# Patient Record
Sex: MALE | Race: BLACK OR AFRICAN AMERICAN | NOT HISPANIC OR LATINO | Employment: FULL TIME | RURAL
[De-identification: names, ages, dates, MRNs, and addresses within clinical notes are randomized per-mention and may not be internally consistent; named-entity substitution may affect disease eponyms.]

---

## 2022-11-28 ENCOUNTER — OFFICE VISIT (OUTPATIENT)
Dept: FAMILY MEDICINE CLINIC | Facility: CLINIC | Age: 52
End: 2022-11-28

## 2022-11-28 ENCOUNTER — TELEPHONE (OUTPATIENT)
Dept: FAMILY MEDICINE CLINIC | Facility: CLINIC | Age: 52
End: 2022-11-28

## 2022-11-28 VITALS
DIASTOLIC BLOOD PRESSURE: 84 MMHG | BODY MASS INDEX: 39.17 KG/M2 | HEIGHT: 75 IN | HEART RATE: 81 BPM | OXYGEN SATURATION: 97 % | WEIGHT: 315 LBS | SYSTOLIC BLOOD PRESSURE: 124 MMHG

## 2022-11-28 DIAGNOSIS — Z12.5 SCREENING FOR PROSTATE CANCER: ICD-10-CM

## 2022-11-28 DIAGNOSIS — Z13.1 SCREENING FOR DIABETES MELLITUS: ICD-10-CM

## 2022-11-28 DIAGNOSIS — E78.2 MIXED HYPERLIPIDEMIA: ICD-10-CM

## 2022-11-28 DIAGNOSIS — J45.20 MILD INTERMITTENT ASTHMA WITHOUT COMPLICATION: ICD-10-CM

## 2022-11-28 DIAGNOSIS — G89.29 CHRONIC PAIN OF LEFT KNEE: ICD-10-CM

## 2022-11-28 DIAGNOSIS — I10 BENIGN ESSENTIAL HTN: Primary | ICD-10-CM

## 2022-11-28 DIAGNOSIS — M25.562 CHRONIC PAIN OF LEFT KNEE: ICD-10-CM

## 2022-11-28 DIAGNOSIS — G47.33 OSA (OBSTRUCTIVE SLEEP APNEA): Primary | ICD-10-CM

## 2022-11-28 PROCEDURE — 99204 OFFICE O/P NEW MOD 45 MIN: CPT | Performed by: NURSE PRACTITIONER

## 2022-11-28 RX ORDER — AMLODIPINE BESYLATE 10 MG/1
10 TABLET ORAL DAILY
COMMUNITY
Start: 2022-09-15 | End: 2022-12-27 | Stop reason: SDUPTHER

## 2022-11-28 RX ORDER — DIPHENOXYLATE HYDROCHLORIDE AND ATROPINE SULFATE 2.5; .025 MG/1; MG/1
TABLET ORAL DAILY
COMMUNITY

## 2022-11-28 RX ORDER — VALSARTAN 80 MG/1
80 TABLET ORAL DAILY
COMMUNITY
Start: 2022-10-07 | End: 2022-12-27 | Stop reason: SDUPTHER

## 2022-11-28 RX ORDER — CETIRIZINE HYDROCHLORIDE 10 MG/1
10 TABLET ORAL DAILY
COMMUNITY

## 2022-11-28 RX ORDER — ALBUTEROL SULFATE 90 UG/1
2 AEROSOL, METERED RESPIRATORY (INHALATION) EVERY 4 HOURS PRN
Qty: 18 G | Refills: 1 | Status: SHIPPED | OUTPATIENT
Start: 2022-11-28 | End: 2023-03-08 | Stop reason: SDUPTHER

## 2022-11-28 NOTE — ASSESSMENT & PLAN NOTE
Continue medications.  Denies refills.  Patient states he will return this week for fasting labs.  Goal blood pressure less than 140/90.  Let me know if gets to or above that.  Proper diet and exercise plan discussed and encouraged.  Education provided.  Risk of meds discussed and understood.  Return to clinic or ED with any issues or concerns.

## 2022-11-28 NOTE — PROGRESS NOTES
"Chief Complaint  Med Refill    Subjective          Zane Rebollar presents to [facility]  History of Present Illness     Patient presents to establish care.  He states he was seeing someone in [place] but always had trouble getting in so was coming here.  History of asthma and uses albuterol inhaler only as needed.  Hypertension well-controlled on amlodipine and valsartan.  Allergies well controlled on Zyrtec.  Also takes a daily multivitamin.  States he is about due his yearly blood work.  States no cardiac issues.  No smoking no alcohol use.  Tries to watch his diet but could do better.  No dizziness no headache no chest pain no chest pressure no shortness of breath no trouble breathing no urinary or bowel issues.    States in January 2022 he was at work in 6 railroad ties fell onto his left leg resulting in tib-fib fractures.  States he underwent 2 surgeries and has a kelley in his left leg.  States they went back in and scraped his left knee due to popping and states that did improve the knee.  [facility] surgeries were completed by  in [place].  He was doing physical therapy but states that was stopped due to Worker's Comp.  States he continues to have weakness in the left knee and feels like it is going to buckle and give out at times.  States he tried going to the gym but has no strength in his knee and cannot extend it.  No redness no warmth.  He states he is in limbo and is not sure where he needs to go with his knee at this point.    States he had a colonoscopy last month at [facility] so we will try to get those records.    History of sleep apnea and he does wear his CPAP.    States he is up-to-date on COVID vaccines.  He will discuss shingles vaccine with his pharmacist.  Denies flu shots.    Objective   Vital Signs:   /84   Pulse 81   Ht 190.5 cm (75\")   Wt (!) 173 kg (382 lb)   SpO2 97%   BMI 47.75 kg/m²     Body mass index is 47.75 " kg/m².    Review of Systems   Constitutional: Negative for chills, fatigue and fever.   Eyes: Negative for visual disturbance.   Respiratory: Negative for cough, shortness of breath and wheezing.    Cardiovascular: Negative.    Gastrointestinal: Negative for abdominal pain, constipation, diarrhea, nausea and vomiting.   Endocrine: Negative for polydipsia, polyphagia and polyuria.   Genitourinary: Negative for decreased urine volume, dysuria, frequency, hematuria and urgency.   Musculoskeletal: Positive for arthralgias. Negative for back pain, joint swelling and myalgias.   Skin: Negative for rash and wound.   Neurological: Negative for dizziness, weakness, numbness and headache.   Psychiatric/Behavioral: Negative.        Past History:  Medical History: has no past medical history on file.   Surgical History: has no past surgical history on file.   Family History: family history is not on file.   Social History: reports that he has never smoked. He has never used smokeless tobacco.    PHQ-2 Depression Screening  Little interest or pleasure in doing things? 0-->not at all   Feeling down, depressed, or hopeless? 0-->not at all   PHQ-2 Total Score 0        PHQ-9 Depression Screening  Little interest or pleasure in doing things? 0-->not at all   Feeling down, depressed, or hopeless? 0-->not at all   Trouble falling or staying asleep, or sleeping too much?     Feeling tired or having little energy?     Poor appetite or overeating?     Feeling bad about yourself - or that you are a failure or have let yourself or your family down?     Trouble concentrating on things, such as reading the newspaper or watching television?     Moving or speaking so slowly that other people could have noticed? Or the opposite - being so fidgety or restless that you have been moving around a lot more than usual?     Thoughts that you would be better off dead, or of hurting yourself in some way?     PHQ-9 Total Score 0   If you checked off any  problems, how difficult have these problems made it for you to do your work, take care of things at home, or get along with other people?       PHQ-9 Total Score: 0      Patient screened positive for depression based on a PHQ-9 score of 0 on 11/28/2022. Follow-up recommendations include:          Current Outpatient Medications:   •  amLODIPine (NORVASC) 10 MG tablet, Take 10 mg by mouth Daily., Disp: , Rfl:   •  cetirizine (zyrTEC) 10 MG tablet, Take 10 mg by mouth Daily., Disp: , Rfl:   •  multivitamin (ONE-A-DAY MENS PO), Take  by mouth Daily., Disp: , Rfl:   •  valsartan (DIOVAN) 80 MG tablet, Take 80 mg by mouth Daily., Disp: , Rfl:   •  albuterol sulfate  (90 Base) MCG/ACT inhaler, Inhale 2 puffs Every 4 (Four) Hours As Needed for Wheezing., Disp: 18 g, Rfl: 1   (Not in a hospital admission)     Allergies: Patient has no known allergies.    Physical Exam  Constitutional:       Appearance: Normal appearance. He is obese.   HENT:      Right Ear: Tympanic membrane, ear canal and external ear normal.      Left Ear: Tympanic membrane, ear canal and external ear normal.      Nose: Nose normal.      Mouth/Throat:      Mouth: Mucous membranes are moist.      Pharynx: Oropharynx is clear.   Eyes:      Extraocular Movements: Extraocular movements intact.      Conjunctiva/sclera: Conjunctivae normal.      Pupils: Pupils are equal, round, and reactive to light.   Cardiovascular:      Rate and Rhythm: Normal rate and regular rhythm.      Heart sounds: Normal heart sounds.   Pulmonary:      Effort: Pulmonary effort is normal.      Breath sounds: Normal breath sounds.   Musculoskeletal:      Left knee: No swelling, erythema or crepitus. Normal range of motion. No tenderness. Normal alignment. Normal pulse.      Right lower leg: No edema.      Left lower leg: No edema.      Comments: Slight tenderness to left medial knee   Skin:     General: Skin is warm.      Findings: No erythema or rash.   Neurological:      General:  No focal deficit present.      Mental Status: He is alert and oriented to person, place, and time. Mental status is at baseline.   Psychiatric:         Mood and Affect: Mood normal.         Behavior: Behavior normal.         Thought Content: Thought content normal.         Judgment: Judgment normal.          Result Review :                   Assessment and Plan    Diagnoses and all orders for this visit:    1. Benign essential HTN (Primary)  Assessment & Plan:  Continue medications.  Denies refills.  Patient states he will return this week for fasting labs.  Goal blood pressure less than 140/90.  Let me know if gets to or above that.  Proper diet and exercise plan discussed and encouraged.  Education provided.  Risk of meds discussed and understood.  Return to clinic or ED with any issues or concerns.    Orders:  -     CBC & Differential; Future  -     Comprehensive Metabolic Panel; Future  -     TSH Rfx On Abnormal To Free T4; Future  -     Lipid Panel; Future  -     POC Urinalysis Dipstick, Automated; Future    2. Mixed hyperlipidemia  Assessment & Plan:  Patient will return for fasting labs.      3. Screening for diabetes mellitus  -     Hemoglobin A1c; Future    4. Chronic pain of left knee  Assessment & Plan:  Gave order for physical therapy.  RICE encouraged.  Will try to get him a follow-up appointment with his orthopedist  for further evaluation.  Go to ED if worsens.  Return to clinic or ED with any issues or concerns.    Orders:  -     Miscellaneous DME  -     Ambulatory Referral to Orthopedic Surgery    5. Mild intermittent asthma without complication  Assessment & Plan:  Albuterol inhaler refilled to use as needed.  Risk discussed understood.  Return to clinic or ED with any issues or concerns.    Orders:  -     albuterol sulfate  (90 Base) MCG/ACT inhaler; Inhale 2 puffs Every 4 (Four) Hours As Needed for Wheezing.  Dispense: 18 g; Refill: 1    6. Screening for prostate cancer  -     PSA  SCREENING; Future            Class 3 Severe Obesity (BMI >=40). Obesity-related health conditions include the following: obstructive sleep apnea and hypertension. Obesity is improving with treatment. BMI is is above average; BMI management plan is completed. We discussed portion control and increasing exercise.       Follow Up   Return in about 3 months (around 2/28/2023).  Patient was given instructions and counseling regarding his condition or for health maintenance advice. Please see specific information pulled into the AVS if appropriate.     ZACK Felix

## 2022-11-28 NOTE — TELEPHONE ENCOUNTER
Please get colonoscopy report from [facility] and get put into care gaps. Thanks

## 2022-11-28 NOTE — ASSESSMENT & PLAN NOTE
Gave order for physical therapy.  RICE encouraged.  Will try to get him a follow-up appointment with his orthopedist  for further evaluation.  Go to ED if worsens.  Return to clinic or ED with any issues or concerns.

## 2022-11-28 NOTE — TELEPHONE ENCOUNTER
Patient needs a new script sent to [facility] in [place] for new filters and supplies for his CPAP.

## 2022-11-28 NOTE — ASSESSMENT & PLAN NOTE
Albuterol inhaler refilled to use as needed.  Risk discussed understood.  Return to clinic or ED with any issues or concerns.

## 2022-11-29 NOTE — TELEPHONE ENCOUNTER
Sending to [facility] regarding cpap supplies.    Will get cscope from [facility].

## 2022-11-29 NOTE — TELEPHONE ENCOUNTER
Order for cpap machine supplies printed and given to waylon. Please fax order to requesting supply store listed below. Tanks

## 2022-11-30 ENCOUNTER — LAB (OUTPATIENT)
Dept: FAMILY MEDICINE CLINIC | Facility: CLINIC | Age: 52
End: 2022-11-30

## 2022-11-30 DIAGNOSIS — Z13.1 SCREENING FOR DIABETES MELLITUS: ICD-10-CM

## 2022-11-30 DIAGNOSIS — Z12.5 SCREENING FOR PROSTATE CANCER: ICD-10-CM

## 2022-11-30 DIAGNOSIS — I10 BENIGN ESSENTIAL HTN: ICD-10-CM

## 2022-11-30 PROCEDURE — 36415 COLL VENOUS BLD VENIPUNCTURE: CPT | Performed by: NURSE PRACTITIONER

## 2022-12-01 LAB
ALBUMIN SERPL-MCNC: 4.3 G/DL (ref 3.8–4.9)
ALBUMIN/GLOB SERPL: 1.3 {RATIO} (ref 1.2–2.2)
ALP SERPL-CCNC: 125 IU/L (ref 44–121)
ALT SERPL-CCNC: 19 IU/L (ref 0–44)
AST SERPL-CCNC: 20 IU/L (ref 0–40)
BASOPHILS # BLD AUTO: 0 X10E3/UL (ref 0–0.2)
BASOPHILS NFR BLD AUTO: 0 %
BILIRUB SERPL-MCNC: 0.3 MG/DL (ref 0–1.2)
BUN SERPL-MCNC: 13 MG/DL (ref 6–24)
BUN/CREAT SERPL: 13 (ref 9–20)
CALCIUM SERPL-MCNC: 9.7 MG/DL (ref 8.7–10.2)
CHLORIDE SERPL-SCNC: 98 MMOL/L (ref 96–106)
CHOLEST SERPL-MCNC: 222 MG/DL (ref 100–199)
CO2 SERPL-SCNC: 26 MMOL/L (ref 20–29)
CREAT SERPL-MCNC: 1.02 MG/DL (ref 0.76–1.27)
EGFRCR SERPLBLD CKD-EPI 2021: 88 ML/MIN/1.73
EOSINOPHIL # BLD AUTO: 0.2 X10E3/UL (ref 0–0.4)
EOSINOPHIL NFR BLD AUTO: 1 %
ERYTHROCYTE [DISTWIDTH] IN BLOOD BY AUTOMATED COUNT: 14.5 % (ref 11.6–15.4)
GLOBULIN SER CALC-MCNC: 3.2 G/DL (ref 1.5–4.5)
GLUCOSE SERPL-MCNC: 88 MG/DL (ref 70–99)
HBA1C MFR BLD: 5.8 % (ref 4.8–5.6)
HCT VFR BLD AUTO: 42.5 % (ref 37.5–51)
HDLC SERPL-MCNC: 44 MG/DL
HGB BLD-MCNC: 13 G/DL (ref 13–17.7)
IMM GRANULOCYTES # BLD AUTO: 0.1 X10E3/UL (ref 0–0.1)
IMM GRANULOCYTES NFR BLD AUTO: 1 %
LDLC SERPL CALC-MCNC: 144 MG/DL (ref 0–99)
LYMPHOCYTES # BLD AUTO: 3.2 X10E3/UL (ref 0.7–3.1)
LYMPHOCYTES NFR BLD AUTO: 24 %
MCH RBC QN AUTO: 24.5 PG (ref 26.6–33)
MCHC RBC AUTO-ENTMCNC: 30.6 G/DL (ref 31.5–35.7)
MCV RBC AUTO: 80 FL (ref 79–97)
MONOCYTES # BLD AUTO: 0.8 X10E3/UL (ref 0.1–0.9)
MONOCYTES NFR BLD AUTO: 6 %
NEUTROPHILS # BLD AUTO: 9.1 X10E3/UL (ref 1.4–7)
NEUTROPHILS NFR BLD AUTO: 68 %
PLATELET # BLD AUTO: 325 X10E3/UL (ref 150–450)
POTASSIUM SERPL-SCNC: 4.9 MMOL/L (ref 3.5–5.2)
PROT SERPL-MCNC: 7.5 G/DL (ref 6–8.5)
PSA SERPL-MCNC: 1.2 NG/ML (ref 0–4)
RBC # BLD AUTO: 5.31 X10E6/UL (ref 4.14–5.8)
SODIUM SERPL-SCNC: 138 MMOL/L (ref 134–144)
TRIGL SERPL-MCNC: 188 MG/DL (ref 0–149)
TSH SERPL DL<=0.005 MIU/L-ACNC: 3.41 UIU/ML (ref 0.45–4.5)
VLDLC SERPL CALC-MCNC: 34 MG/DL (ref 5–40)
WBC # BLD AUTO: 13.5 X10E3/UL (ref 3.4–10.8)

## 2022-12-08 DIAGNOSIS — D72.829 LEUKOCYTOSIS, UNSPECIFIED TYPE: Primary | ICD-10-CM

## 2022-12-22 ENCOUNTER — TELEPHONE (OUTPATIENT)
Dept: FAMILY MEDICINE CLINIC | Facility: CLINIC | Age: 52
End: 2022-12-22
Payer: COMMERCIAL

## 2022-12-22 DIAGNOSIS — G47.33 OSA (OBSTRUCTIVE SLEEP APNEA): Primary | ICD-10-CM

## 2022-12-22 NOTE — TELEPHONE ENCOUNTER
Caller: Zane Rebollar    Relationship: Self    Best call back number: [phone]    What is the best time to reach you: ANYTIME    Who are you requesting to speak with (clinical staff, provider,  specific staff member): CLINICAL STAFF    Do you know the name of the person who called: PATIENT    What was the call regarding: PATIENT IS CALLING TO REQUEST NEW SCRIPTS OF HIS MEDICATION TO BE SENT TO [facility] [address] [facility] IN [place]. THE PHARMACY NEEDS NEW SCRIPTS SINCE IT IS A NEW DOCTOR PRESCRIBING. HE ALSO NEEDS A NEW SCRIPT SENT FOR C-PAP EQUIPMENT SENT TO [facility] ON [address] IN [place] AS WELL.    Do you require a callback: NOT UNLESS IT IS NEEDED.

## 2022-12-22 NOTE — TELEPHONE ENCOUNTER
Looks like we only sent in albuterol. [facility] is in chart as primary pharmacy. Are you doing his other medications? Also, can we send in an order for new cpap equipment? We don't have the report

## 2022-12-27 RX ORDER — VALSARTAN 80 MG/1
80 TABLET ORAL DAILY
Qty: 90 TABLET | Refills: 1 | Status: SHIPPED | OUTPATIENT
Start: 2022-12-27 | End: 2023-03-08 | Stop reason: SDUPTHER

## 2022-12-27 RX ORDER — AMLODIPINE BESYLATE 10 MG/1
10 TABLET ORAL DAILY
Qty: 90 TABLET | Refills: 1 | Status: SHIPPED | OUTPATIENT
Start: 2022-12-27 | End: 2023-03-08 | Stop reason: SDUPTHER

## 2022-12-27 NOTE — TELEPHONE ENCOUNTER
PATIENT CALLED IN I RELAYED THE HUB TO READ MESSAGE THE PATIENT IS NOT SURE HOW TO FIND THE SETTINGS FOR HIS CPAP MACHINE AND HE IS NOT WITH HIS MACHINE CURRENTLY

## 2022-12-27 NOTE — TELEPHONE ENCOUNTER
HUB TO READ    I sent in the valsartan and amlodipine. What are his settings on his cpap machine?     Message left

## 2022-12-29 NOTE — TELEPHONE ENCOUNTER
Please fax DME order for cpap supplies to [facility] in [place]. Please let pt. Know. DME order placed on [facility].

## 2023-02-13 ENCOUNTER — LAB (OUTPATIENT)
Dept: FAMILY MEDICINE CLINIC | Facility: CLINIC | Age: 53
End: 2023-02-13
Payer: COMMERCIAL

## 2023-02-13 DIAGNOSIS — D72.829 LEUKOCYTOSIS, UNSPECIFIED TYPE: ICD-10-CM

## 2023-02-13 PROCEDURE — 36415 COLL VENOUS BLD VENIPUNCTURE: CPT | Performed by: NURSE PRACTITIONER

## 2023-02-14 LAB
BASOPHILS # BLD AUTO: 0 X10E3/UL (ref 0–0.2)
BASOPHILS NFR BLD AUTO: 0 %
EOSINOPHIL # BLD AUTO: 0.2 X10E3/UL (ref 0–0.4)
EOSINOPHIL NFR BLD AUTO: 2 %
ERYTHROCYTE [DISTWIDTH] IN BLOOD BY AUTOMATED COUNT: 15.3 % (ref 11.6–15.4)
HCT VFR BLD AUTO: 41.6 % (ref 37.5–51)
HGB BLD-MCNC: 13.2 G/DL (ref 13–17.7)
IMM GRANULOCYTES # BLD AUTO: 0.1 X10E3/UL (ref 0–0.1)
IMM GRANULOCYTES NFR BLD AUTO: 1 %
LYMPHOCYTES # BLD AUTO: 2.9 X10E3/UL (ref 0.7–3.1)
LYMPHOCYTES NFR BLD AUTO: 26 %
MCH RBC QN AUTO: 25 PG (ref 26.6–33)
MCHC RBC AUTO-ENTMCNC: 31.7 G/DL (ref 31.5–35.7)
MCV RBC AUTO: 79 FL (ref 79–97)
MONOCYTES # BLD AUTO: 0.7 X10E3/UL (ref 0.1–0.9)
MONOCYTES NFR BLD AUTO: 6 %
NEUTROPHILS # BLD AUTO: 7.4 X10E3/UL (ref 1.4–7)
NEUTROPHILS NFR BLD AUTO: 65 %
PLATELET # BLD AUTO: 272 X10E3/UL (ref 150–450)
RBC # BLD AUTO: 5.28 X10E6/UL (ref 4.14–5.8)
WBC # BLD AUTO: 11.3 X10E3/UL (ref 3.4–10.8)

## 2023-02-15 DIAGNOSIS — D72.829 LEUKOCYTOSIS, UNSPECIFIED TYPE: Primary | ICD-10-CM

## 2023-03-08 DIAGNOSIS — J45.20 MILD INTERMITTENT ASTHMA WITHOUT COMPLICATION: ICD-10-CM

## 2023-03-08 RX ORDER — AMLODIPINE BESYLATE 10 MG/1
10 TABLET ORAL DAILY
Qty: 90 TABLET | Refills: 1 | Status: SHIPPED | OUTPATIENT
Start: 2023-03-08

## 2023-03-08 RX ORDER — ALBUTEROL SULFATE 90 UG/1
2 AEROSOL, METERED RESPIRATORY (INHALATION) EVERY 4 HOURS PRN
Qty: 18 G | Refills: 1 | Status: SHIPPED | OUTPATIENT
Start: 2023-03-08

## 2023-03-08 RX ORDER — VALSARTAN 80 MG/1
80 TABLET ORAL DAILY
Qty: 90 TABLET | Refills: 1 | Status: SHIPPED | OUTPATIENT
Start: 2023-03-08

## 2023-03-08 NOTE — TELEPHONE ENCOUNTER
Caller: Zane Rebollar    Relationship: Self    Best call back number: [phone]    Requested Prescriptions:   Requested Prescriptions     Pending Prescriptions Disp Refills   • amLODIPine (NORVASC) 10 MG tablet 90 tablet 1     Sig: Take 1 tablet by mouth Daily.   • valsartan (DIOVAN) 80 MG tablet 90 tablet 1     Sig: Take 1 tablet by mouth Daily.   • albuterol sulfate  (90 Base) MCG/ACT inhaler 18 g 1     Sig: Inhale 2 puffs Every 4 (Four) Hours As Needed for Wheezing.        Pharmacy where request should be sent: [facility] [id number] [place], [place] - [address] AT [facility] [address] - [phone]  - [phone] FX     Additional details provided by patient:     Does the patient have less than a 3 day supply:  [] Yes  [x] No    Would you like a call back once the refill request has been completed: [] Yes [x] No    If the office needs to give you a call back, can they leave a voicemail: [] Yes [x] No    Leyla Bowling   03/08/23 08:53 EST

## 2023-03-21 ENCOUNTER — CONSULT (OUTPATIENT)
Dept: ONCOLOGY | Facility: CLINIC | Age: 53
End: 2023-03-21
Payer: COMMERCIAL

## 2023-03-21 VITALS
RESPIRATION RATE: 20 BRPM | BODY MASS INDEX: 39.17 KG/M2 | DIASTOLIC BLOOD PRESSURE: 92 MMHG | HEIGHT: 75 IN | OXYGEN SATURATION: 96 % | HEART RATE: 69 BPM | WEIGHT: 315 LBS | SYSTOLIC BLOOD PRESSURE: 164 MMHG | TEMPERATURE: 98.2 F

## 2023-03-21 DIAGNOSIS — D72.828 OTHER ELEVATED WHITE BLOOD CELL (WBC) COUNT: Primary | Chronic | ICD-10-CM

## 2023-03-21 DIAGNOSIS — D72.828 OTHER ELEVATED WHITE BLOOD CELL (WBC) COUNT: Chronic | ICD-10-CM

## 2023-03-21 PROBLEM — D72.829 LEUCOCYTOSIS: Chronic | Status: ACTIVE | Noted: 2023-03-21

## 2023-03-21 PROBLEM — D72.829 LEUCOCYTOSIS: Status: ACTIVE | Noted: 2023-03-21

## 2023-03-21 PROCEDURE — 99203 OFFICE O/P NEW LOW 30 MIN: CPT | Performed by: INTERNAL MEDICINE

## 2023-03-21 NOTE — PROGRESS NOTES
CHIEF COMPLAINT: Chronic leg pain posttraumatic fracture    REASON FOR REFERRAL: Leukocytosis      RECORDS OBTAINED  Records of the patients history including those obtained from primary care were reviewed and summarized in detail.    Oncology/Hematology History Overview Note   1.  Leukocytosis  2.  Sleep apnea  3.  History of left tibial fracture  4.  Hypertension  5.  Asthma and allergies  6.  Hypertension    Hematology history timeline:  -1/24/2022 white count 18,000 hemoglobin 12.8 platelets 235,000 no nucleated red cells.  -11/30/2022 white count 13,500 hemoglobin 13 platelets 325,000 unremarkable differential.  -2/13/2023 white count 11,300 hemoglobin 13.2 platelets 272,000 with unremarkable differential    -3/21/2023 [facility] hematology consult: Patient had traumatic tibial fracture after training rail wooden ties fell on him.  Had pinning of his left tibia.  Chronic pain.  Slowly improving white count over the last year since surgery for this.  Rest of his counts are unremarkable and his differential is unremarkable and alkaline phosphatase back in the fall was minimally elevated.  Given that his white count is approaching normal as of last month we will get his count now and in 3 months and see him back in 6 months along with a sedimentation rate and C-reactive protein and as long as the trend continues I would just assume this to be reactive at the age of 52 and not put him through further work-up.  Odds of a low-grade malignant hematologic process slowly improving spontaneously very low.  A biopsy of a high-grade malignant hematologic process slowly improving without intervention extremely unlikely.     Leucocytosis       HISTORY OF PRESENT ILLNESS:  The patient is a 52 y.o.  male, referred for modest leukocytosis slowly improving over the last year after traumatic tibial fracture with pinning treated at [facility]    REVIEW OF SYSTEMS:  Leg pain chronically    Past Medical History:  "  Diagnosis Date   • Hypertension      No past surgical history on file.    Current Outpatient Medications on File Prior to Visit   Medication Sig Dispense Refill   • albuterol sulfate  (90 Base) MCG/ACT inhaler Inhale 2 puffs Every 4 (Four) Hours As Needed for Wheezing. 18 g 1   • amLODIPine (NORVASC) 10 MG tablet Take 1 tablet by mouth Daily. 90 tablet 1   • cetirizine (zyrTEC) 10 MG tablet Take 10 mg by mouth Daily.     • multivitamin (ONE-A-DAY MENS PO) Take  by mouth Daily.     • valsartan (DIOVAN) 80 MG tablet Take 1 tablet by mouth Daily. 90 tablet 1     No current facility-administered medications on file prior to visit.       No Known Allergies    Social History     Socioeconomic History   • Marital status:    Tobacco Use   • Smoking status: Never   • Smokeless tobacco: Never   Vaping Use   • Vaping Use: Never used   Substance and Sexual Activity   • Alcohol use: Yes     Comment: 2 drinks / wk       Family History   Problem Relation Age of Onset   • Lymphoma Mother    • Testicular cancer Maternal Uncle        PHYSICAL EXAM:  No respiratory distress.  No rashes.  No palpable cervical axillary or inguinal adenopathy.  No hepatosplenomegaly.    /92   Pulse 69   Temp 98.2 °F (36.8 °C)   Resp 20   Ht 190.5 cm (75\")   Wt (!) 173 kg (382 lb)   SpO2 96%   BMI 47.75 kg/m²     ECOG score: 0           ECOG: (0) Fully Active - Able to Carry On All Pre-disease Performance Without Restriction    Lab Results   Component Value Date    HGB 13.2 02/13/2023    HCT 41.6 02/13/2023    MCV 79 02/13/2023     02/13/2023    WBC 11.3 (H) 02/13/2023    NEUTROABS 7.4 (H) 02/13/2023    LYMPHSABS 2.9 02/13/2023    MONOSABS 0.7 02/13/2023    EOSABS 0.2 02/13/2023    BASOSABS 0.0 02/13/2023     Lab Results   Component Value Date    GLUCOSE 88 11/30/2022    BUN 13 11/30/2022    CREATININE 1.02 11/30/2022     11/30/2022    K 4.9 11/30/2022    CL 98 11/30/2022    CO2 26 11/30/2022    CALCIUM 9.7 " 11/30/2022    ALBUMIN 4.3 11/30/2022    BILITOT 0.3 11/30/2022    ALKPHOS 125 (H) 11/30/2022    AST 20 11/30/2022    ALT 19 11/30/2022         Assessment & Plan   1.  Leukocytosis  2.  Sleep apnea  3.  History of left tibial fracture  4.  Hypertension  5.  Asthma and allergies  6.  Hypertension    Hematology history timeline:  -1/24/2022 white count 18,000 hemoglobin 12.8 platelets 235,000 no nucleated red cells.  -11/30/2022 white count 13,500 hemoglobin 13 platelets 325,000 unremarkable differential.  -2/13/2023 white count 11,300 hemoglobin 13.2 platelets 272,000 with unremarkable differential    -3/21/2023 [facility] hematology consult: Patient had traumatic tibial fracture after training rail wooden ties fell on him.  Had pinning of his left tibia.  Chronic pain.  Slowly improving white count over the last year since surgery for this.  Rest of his counts are unremarkable and his differential is unremarkable and alkaline phosphatase back in the fall was minimally elevated.  Given that his white count is approaching normal as of last month we will get his count now and in 3 months and see him back in 6 months along with a sedimentation rate and C-reactive protein and as long as the trend continues I would just assume this to be reactive at the age of 52 and not put him through further work-up.  Odds of a low-grade malignant hematologic process slowly improving spontaneously very low.  A biopsy of a high-grade malignant hematologic process slowly improving without intervention extremely unlikely.    Total time of care today inclusive of time spent today prior to his arrival reviewing past records including orthopedic notes and primary care notes and after visit instituting the plan as outlined above and during visit talking about the broad differential diagnosis of leukocytosis and the likelihood of this being reactive in nature took 30 minutes of patient care time throughout the day today.    Total time of  care      Zachery Milian MD    3/21/2023

## 2023-03-22 LAB
BASOPHILS # BLD AUTO: 0.04 10*3/MM3 (ref 0–0.2)
BASOPHILS NFR BLD AUTO: 0.3 % (ref 0–1.5)
CRP SERPL-MCNC: 3.2 MG/DL (ref 0–0.5)
EOSINOPHIL # BLD AUTO: 0.2 10*3/MM3 (ref 0–0.4)
EOSINOPHIL NFR BLD AUTO: 1.6 % (ref 0.3–6.2)
ERYTHROCYTE [DISTWIDTH] IN BLOOD BY AUTOMATED COUNT: 14.8 % (ref 12.3–15.4)
ERYTHROCYTE [SEDIMENTATION RATE] IN BLOOD BY WESTERGREN METHOD: 82 MM/HR (ref 0–20)
HCT VFR BLD AUTO: 41.7 % (ref 37.5–51)
HGB BLD-MCNC: 13.5 G/DL (ref 13–17.7)
IMM GRANULOCYTES # BLD AUTO: 0.08 10*3/MM3 (ref 0–0.05)
IMM GRANULOCYTES NFR BLD AUTO: 0.6 % (ref 0–0.5)
LYMPHOCYTES # BLD AUTO: 2.82 10*3/MM3 (ref 0.7–3.1)
LYMPHOCYTES NFR BLD AUTO: 22.4 % (ref 19.6–45.3)
MCH RBC QN AUTO: 25.4 PG (ref 26.6–33)
MCHC RBC AUTO-ENTMCNC: 32.4 G/DL (ref 31.5–35.7)
MCV RBC AUTO: 78.5 FL (ref 79–97)
MONOCYTES # BLD AUTO: 0.79 10*3/MM3 (ref 0.1–0.9)
MONOCYTES NFR BLD AUTO: 6.3 % (ref 5–12)
NEUTROPHILS # BLD AUTO: 8.66 10*3/MM3 (ref 1.7–7)
NEUTROPHILS NFR BLD AUTO: 68.8 % (ref 42.7–76)
NRBC BLD AUTO-RTO: 0 /100 WBC (ref 0–0.2)
PLATELET # BLD AUTO: 292 10*3/MM3 (ref 140–450)
RBC # BLD AUTO: 5.31 10*6/MM3 (ref 4.14–5.8)
WBC # BLD AUTO: 12.59 10*3/MM3 (ref 3.4–10.8)

## 2023-04-11 NOTE — TELEPHONE ENCOUNTER
Caller: Hutchinson Zane    Relationship: Self    Best call back number: [phone]    Requested Prescriptions:   Requested Prescriptions     Pending Prescriptions Disp Refills   • valsartan (DIOVAN) 80 MG tablet 90 tablet 1     Sig: Take 1 tablet by mouth Daily.        Pharmacy where request should be sent: [facility] [id number] [place], [place] - [address] AT [facility] [address] - [phone] [facility] [phone] FX     Last office visit with prescribing clinician: 11/28/2022   Last telemedicine visit with prescribing clinician: Visit date not found   Next office visit with prescribing clinician: Visit date not found       Does the patient have less than a 3 day supply:  [x] Yes  [] No    Would you like a call back once the refill request has been completed: [x] Yes [] No    If the office needs to give you a call back, can they leave a voicemail: [x] Yes [] No    Pratima Tellez   04/11/23 16:22 EDT

## 2023-04-13 RX ORDER — VALSARTAN 80 MG/1
80 TABLET ORAL DAILY
Qty: 90 TABLET | Refills: 1 | Status: SHIPPED | OUTPATIENT
Start: 2023-04-13

## 2023-08-24 ENCOUNTER — TELEPHONE (OUTPATIENT)
Dept: ONCOLOGY | Facility: CLINIC | Age: 53
End: 2023-08-24
Payer: COMMERCIAL

## 2023-08-24 NOTE — TELEPHONE ENCOUNTER
Caller: Kathy Rebollar    Relationship: Self    Best call back number: [phone]    What is the best time to reach you: ANY    Who are you requesting to speak with (clinical staff, provider,  specific staff member): CLINICAL     What was the call regarding: KATHY IS NEEDING LAB APPOINTMENT FOR HIS UPCOMING APPOINTMENT THAT IS SCHEDULED ON 9-26    KATHY WANTED TO DO HIS LABS AT [facility]     Is it okay if the provider responds through [facility]: YES

## 2023-08-25 ENCOUNTER — LAB (OUTPATIENT)
Dept: FAMILY MEDICINE CLINIC | Facility: CLINIC | Age: 53
End: 2023-08-25
Payer: COMMERCIAL

## 2023-08-25 DIAGNOSIS — D72.828 OTHER ELEVATED WHITE BLOOD CELL (WBC) COUNT: ICD-10-CM

## 2023-08-25 PROCEDURE — 36415 COLL VENOUS BLD VENIPUNCTURE: CPT | Performed by: INTERNAL MEDICINE

## 2023-08-26 LAB
BASOPHILS # BLD AUTO: 0 X10E3/UL (ref 0–0.2)
BASOPHILS NFR BLD AUTO: 0 %
EOSINOPHIL # BLD AUTO: 0.3 X10E3/UL (ref 0–0.4)
EOSINOPHIL NFR BLD AUTO: 3 %
ERYTHROCYTE [DISTWIDTH] IN BLOOD BY AUTOMATED COUNT: 14.8 % (ref 11.6–15.4)
HCT VFR BLD AUTO: 41.7 % (ref 37.5–51)
HGB BLD-MCNC: 13.1 G/DL (ref 13–17.7)
IMM GRANULOCYTES # BLD AUTO: 0.1 X10E3/UL (ref 0–0.1)
IMM GRANULOCYTES NFR BLD AUTO: 1 %
LYMPHOCYTES # BLD AUTO: 2.4 X10E3/UL (ref 0.7–3.1)
LYMPHOCYTES NFR BLD AUTO: 21 %
MCH RBC QN AUTO: 25.1 PG (ref 26.6–33)
MCHC RBC AUTO-ENTMCNC: 31.4 G/DL (ref 31.5–35.7)
MCV RBC AUTO: 80 FL (ref 79–97)
MONOCYTES # BLD AUTO: 0.8 X10E3/UL (ref 0.1–0.9)
MONOCYTES NFR BLD AUTO: 7 %
NEUTROPHILS # BLD AUTO: 7.8 X10E3/UL (ref 1.4–7)
NEUTROPHILS NFR BLD AUTO: 68 %
PLATELET # BLD AUTO: 251 X10E3/UL (ref 150–450)
RBC # BLD AUTO: 5.21 X10E6/UL (ref 4.14–5.8)
WBC # BLD AUTO: 11.4 X10E3/UL (ref 3.4–10.8)

## 2023-09-22 ENCOUNTER — OFFICE VISIT (OUTPATIENT)
Dept: FAMILY MEDICINE CLINIC | Facility: CLINIC | Age: 53
End: 2023-09-22
Payer: COMMERCIAL

## 2023-09-22 ENCOUNTER — TELEPHONE (OUTPATIENT)
Dept: FAMILY MEDICINE CLINIC | Facility: CLINIC | Age: 53
End: 2023-09-22

## 2023-09-22 VITALS
HEIGHT: 75 IN | HEART RATE: 63 BPM | OXYGEN SATURATION: 97 % | BODY MASS INDEX: 39.17 KG/M2 | SYSTOLIC BLOOD PRESSURE: 130 MMHG | DIASTOLIC BLOOD PRESSURE: 84 MMHG | WEIGHT: 315 LBS

## 2023-09-22 DIAGNOSIS — D72.828 OTHER ELEVATED WHITE BLOOD CELL (WBC) COUNT: Chronic | ICD-10-CM

## 2023-09-22 DIAGNOSIS — R73.03 PREDIABETES: ICD-10-CM

## 2023-09-22 DIAGNOSIS — Z11.59 NEED FOR HEPATITIS C SCREENING TEST: ICD-10-CM

## 2023-09-22 DIAGNOSIS — Z23 IMMUNIZATION DUE: ICD-10-CM

## 2023-09-22 DIAGNOSIS — Z00.00 ANNUAL PHYSICAL EXAM: Primary | ICD-10-CM

## 2023-09-22 DIAGNOSIS — I10 BENIGN ESSENTIAL HTN: ICD-10-CM

## 2023-09-22 DIAGNOSIS — G89.29 CHRONIC PAIN OF LEFT KNEE: ICD-10-CM

## 2023-09-22 DIAGNOSIS — Z12.5 SCREENING FOR PROSTATE CANCER: ICD-10-CM

## 2023-09-22 DIAGNOSIS — E66.01 CLASS 3 SEVERE OBESITY DUE TO EXCESS CALORIES WITH SERIOUS COMORBIDITY AND BODY MASS INDEX (BMI) OF 45.0 TO 49.9 IN ADULT: ICD-10-CM

## 2023-09-22 DIAGNOSIS — Z12.11 SCREENING FOR COLON CANCER: ICD-10-CM

## 2023-09-22 DIAGNOSIS — J45.20 MILD INTERMITTENT ASTHMA WITHOUT COMPLICATION: ICD-10-CM

## 2023-09-22 DIAGNOSIS — E78.2 MIXED HYPERLIPIDEMIA: ICD-10-CM

## 2023-09-22 DIAGNOSIS — M25.562 CHRONIC PAIN OF LEFT KNEE: ICD-10-CM

## 2023-09-22 DIAGNOSIS — G47.33 OSA (OBSTRUCTIVE SLEEP APNEA): ICD-10-CM

## 2023-09-22 LAB
BILIRUB BLD-MCNC: NEGATIVE MG/DL
CLARITY, POC: CLEAR
COLOR UR: YELLOW
EXPIRATION DATE: NORMAL
GLUCOSE UR STRIP-MCNC: NEGATIVE MG/DL
KETONES UR QL: NEGATIVE
LEUKOCYTE EST, POC: NEGATIVE
Lab: NORMAL
NITRITE UR-MCNC: NEGATIVE MG/ML
PH UR: 6 [PH] (ref 5–8)
PROT UR STRIP-MCNC: NEGATIVE MG/DL
RBC # UR STRIP: NEGATIVE /UL
SP GR UR: 1.02 (ref 1–1.03)
UROBILINOGEN UR QL: NORMAL

## 2023-09-22 RX ORDER — AMLODIPINE BESYLATE 10 MG/1
10 TABLET ORAL DAILY
Qty: 90 TABLET | Refills: 1 | Status: SHIPPED | OUTPATIENT
Start: 2023-09-22

## 2023-09-22 RX ORDER — VALSARTAN 80 MG/1
80 TABLET ORAL DAILY
Qty: 90 TABLET | Refills: 1 | Status: SHIPPED | OUTPATIENT
Start: 2023-09-22

## 2023-09-22 RX ORDER — SEMAGLUTIDE 0.25 MG/.5ML
0.25 INJECTION, SOLUTION SUBCUTANEOUS WEEKLY
Qty: 2 ML | Refills: 0 | Status: SHIPPED | OUTPATIENT
Start: 2023-09-22

## 2023-09-22 RX ORDER — CALCIUM CARBONATE 500 MG/1
500 TABLET, CHEWABLE ORAL DAILY
COMMUNITY

## 2023-09-22 NOTE — TELEPHONE ENCOUNTER
Please get colonoscopy report from [facility] and let me and pt. Know when due repeat. Thanks

## 2023-09-22 NOTE — ASSESSMENT & PLAN NOTE
Fasting labs drawn.  UA completed.  Goal blood pressure less than 140/90.  Let me know if gets to or above that.  PHQ-9 completed and discussed.  No thoughts of suicide or hurting himself or anyone else.  Proper diet and exercise plan discussed and encouraged.  Annual dental and eye exams encouraged.  Amlodipine and valsartan refilled.  Risk of meds discussed and understood.  We will request colonoscopy records from [facility].  He will continue to follow-up with his hematologist Dr. Milian and his orthopedist Dr. Abbott as scheduled.  Encouraged him to discuss shingles COVID and flu vaccines with his pharmacist.  Tdap vaccine given today in clinic.  Vaccine information sheet given.  Risk discussed and understood.  Patient tolerated well.  Education provided.  Return to clinic or ED with any issues or concerns.

## 2023-09-22 NOTE — PROGRESS NOTES
"Chief Complaint  Weight Loss    Subjective          Zane Rebollar presents to [facility] for preventative yearly exam.   History of Present Illness    Presents for annual exam.  Is fasting.  No smoking no alcohol use.  Allergies well controlled on cetirizine.  Hypertension well-controlled on zotepine and valsartan.  Also has history of asthma butyryl rescue inhaler rarely as needed.  He states he does have a history of left knee arthritis and is currently following up with orthopedist Dr. Abbott.  He states they are currently doing injections but feel in the future he possibly may need a knee replacement and he states they would not do it due to his weight.  He is wondering if we could try something such as Wegovy to see if that would help with weight loss.  States he has tried diets and exercise in the past without much improvement.  He states he has seen a bariatric specialist in the past but they wanted to do surgery and he states he was not to that point yet.  He also has a history of elevated white blood cells and continues to follow-up regularly with Dr. Milian.  No dizziness no headache no chest pain no chest pressure no shortness of breath no trouble breathing no urinary or bowel issues.    He states he had a colonoscopy 2 years ago at [facility] and was told to repeat in 10 years from then.  I will try to get those records.    He is due a shingles shot and states he will discuss it with his pharmacist.  States over 10 years since his last tetanus vaccine and he would like to get the Tdap today.  He states he is up-to-date on COVID vaccines and denies flu vaccines.    Objective   Vital Signs:   /84   Pulse 63   Ht 190.5 cm (75\")   Wt (!) 177 kg (390 lb 7 oz)   SpO2 97%   BMI 48.80 kg/m²     Body mass index is 48.8 kg/m².    Predictive Model Details   No score data available for Risk of Fall        PHQ-9 Depression Screening  Little interest or " pleasure in doing things? 0-->not at all   Feeling down, depressed, or hopeless? 0-->not at all   Trouble falling or staying asleep, or sleeping too much?     Feeling tired or having little energy?     Poor appetite or overeating?     Feeling bad about yourself - or that you are a failure or have let yourself or your family down?     Trouble concentrating on things, such as reading the newspaper or watching television?     Moving or speaking so slowly that other people could have noticed? Or the opposite - being so fidgety or restless that you have been moving around a lot more than usual?     Thoughts that you would be better off dead, or of hurting yourself in some way?     PHQ-9 Total Score 0   If you checked off any problems, how difficult have these problems made it for you to do your work, take care of things at home, or get along with other people?       Patient screened positive for depression based on a PHQ-9 score of 0 on 9/22/2023. Follow-up recommendations include:        Immunization History   Administered Date(s) Administered    COVID-19 (PFIZER) BIVALENT 12+YRS 11/01/2022    COVID-19 (PFIZER) Purple Cap Monovalent 03/26/2021, 04/16/2021    Tdap 09/22/2023    Tetanus Toxoid, Unspecified 10/06/2020       Review of Systems   Constitutional: Negative.    HENT: Negative.     Eyes: Negative.    Respiratory: Negative.     Cardiovascular: Negative.    Gastrointestinal: Negative.    Endocrine: Negative for polydipsia, polyphagia and polyuria.   Genitourinary: Negative.    Musculoskeletal: Negative.    Skin: Negative.    Neurological: Negative.    Psychiatric/Behavioral: Negative.       Past History:  Medical History: has a past medical history of Hypertension.   Surgical History: has no past surgical history on file.   Family History: family history includes Lymphoma in his mother; Testicular cancer in his maternal uncle.   Social History: reports that he has never smoked. He has never used smokeless tobacco.  He reports current alcohol use.      Current Outpatient Medications:     albuterol sulfate  (90 Base) MCG/ACT inhaler, Inhale 2 puffs Every 4 (Four) Hours As Needed for Wheezing., Disp: 18 g, Rfl: 1    amLODIPine (NORVASC) 10 MG tablet, Take 1 tablet by mouth Daily., Disp: 90 tablet, Rfl: 1    calcium carbonate (TUMS) 500 MG chewable tablet, Chew 1 tablet Daily., Disp: , Rfl:     cetirizine (zyrTEC) 10 MG tablet, Take 1 tablet by mouth Daily., Disp: , Rfl:     multivitamin (THERAGRAN) tablet tablet, Take  by mouth Daily., Disp: , Rfl:     valsartan (DIOVAN) 80 MG tablet, Take 1 tablet by mouth Daily., Disp: 90 tablet, Rfl: 1    Semaglutide-Weight Management (Wegovy) 0.25 MG/0.5ML solution auto-injector, Inject 0.25 mg under the skin into the appropriate area as directed 1 (One) Time Per Week. Call for next dose, Disp: 2 mL, Rfl: 0   Allergies: Patient has no known allergies.    Physical Exam  Constitutional:       Appearance: Normal appearance. He is obese.   HENT:      Head: Normocephalic.      Right Ear: Tympanic membrane, ear canal and external ear normal.      Left Ear: Tympanic membrane, ear canal and external ear normal.      Nose: Nose normal.      Mouth/Throat:      Mouth: Mucous membranes are moist.      Pharynx: Oropharynx is clear.   Eyes:      Extraocular Movements: Extraocular movements intact.      Conjunctiva/sclera: Conjunctivae normal.      Pupils: Pupils are equal, round, and reactive to light.   Cardiovascular:      Rate and Rhythm: Normal rate and regular rhythm.      Heart sounds: Normal heart sounds.   Pulmonary:      Effort: Pulmonary effort is normal.      Breath sounds: Normal breath sounds.   Abdominal:      General: Abdomen is flat. Bowel sounds are normal.      Palpations: Abdomen is soft.   Genitourinary:     Comments: Denied   Musculoskeletal:         General: Normal range of motion.      Cervical back: Normal range of motion.   Skin:     General: Skin is warm.   Neurological:       General: No focal deficit present.      Mental Status: He is alert and oriented to person, place, and time. Mental status is at baseline.   Psychiatric:         Mood and Affect: Mood normal.         Behavior: Behavior normal.         Thought Content: Thought content normal.         Judgment: Judgment normal.        Result Review :                     Assessment and Plan    Diagnoses and all orders for this visit:    1. Annual physical exam (Primary)  Assessment & Plan:  Fasting labs drawn.  UA completed.  Goal blood pressure less than 140/90.  Let me know if gets to or above that.  PHQ-9 completed and discussed.  No thoughts of suicide or hurting himself or anyone else.  Proper diet and exercise plan discussed and encouraged.  Annual dental and eye exams encouraged.  Amlodipine and valsartan refilled.  Risk of meds discussed and understood.  We will request colonoscopy records from [facility].  He will continue to follow-up with his hematologist Dr. Milian and his orthopedist Dr. Abbott as scheduled.  Encouraged him to discuss shingles COVID and flu vaccines with his pharmacist.  Tdap vaccine given today in clinic.  Vaccine information sheet given.  Risk discussed and understood.  Patient tolerated well.  Education provided.  Return to clinic or ED with any issues or concerns.    Orders:  -     CBC & Differential  -     Comprehensive Metabolic Panel  -     TSH Rfx On Abnormal To Free T4  -     POC Urinalysis Dipstick, Automated; Future  -     POC Urinalysis Dipstick, Automated    2. Benign essential HTN  -     amLODIPine (NORVASC) 10 MG tablet; Take 1 tablet by mouth Daily.  Dispense: 90 tablet; Refill: 1  -     valsartan (DIOVAN) 80 MG tablet; Take 1 tablet by mouth Daily.  Dispense: 90 tablet; Refill: 1    3. Mixed hyperlipidemia  -     Lipid Panel    4. Class 3 severe obesity due to excess calories with serious comorbidity and body mass index (BMI) of 45.0 to 49.9 in adult  Assessment &  Plan:  Labs drawn.  We will try Wegovy.  Patient states no personal or family history of thyroid cancer.  Proper diet and exercise plan discussed and encouraged.  Risk of meds discussed and understood.  Education provided.  Return to clinic or ED with any issues or concerns.      5. Prediabetes  -     Hemoglobin A1c    6. Screening for colon cancer    7. Screening for prostate cancer  Assessment & Plan:  Pt. Knows it has not been a year since last PSA check but he wants to go ahead and have it checked today and understands it may cost him.     Orders:  -     PSA SCREENING; Future    8. Other elevated white blood cell (WBC) count  Assessment & Plan:  Continue to follow-up with hematologist Dr. Milian as scheduled.      9. Need for hepatitis C screening test  -     Hepatitis C Antibody    10. Chronic pain of left knee  Assessment & Plan:  Continue to follow-up with orthopedist Dr. Abbott as scheduled.      11. Mild intermittent asthma without complication    12. Immunization due  -     Cancel: Tdap Vaccine => 6yo IM (BOOSTRIX)  -     Tdap Vaccine => 6yo IM (BOOSTRIX); Future  -     Tdap Vaccine => 6yo IM (BOOSTRIX)    13. CICI (obstructive sleep apnea)  Assessment & Plan:  Continue to wear CPAP nightly.      Other orders  -     Semaglutide-Weight Management (Wegovy) 0.25 MG/0.5ML solution auto-injector; Inject 0.25 mg under the skin into the appropriate area as directed 1 (One) Time Per Week. Call for next dose  Dispense: 2 mL; Refill: 0                      Follow Up   Return in about 3 months (around 12/22/2023).  Patient was given instructions and counseling regarding his condition or for health maintenance advice. Please see specific information pulled into the AVS if appropriate.     ZACK Felix

## 2023-09-22 NOTE — ASSESSMENT & PLAN NOTE
Pt. Knows it has not been a year since last PSA check but he wants to go ahead and have it checked today and understands it may cost him.

## 2023-09-22 NOTE — ASSESSMENT & PLAN NOTE
Labs drawn.  We will try Wegovy.  Patient states no personal or family history of thyroid cancer.  Proper diet and exercise plan discussed and encouraged.  Risk of meds discussed and understood.  Education provided.  Return to clinic or ED with any issues or concerns.

## 2023-09-23 LAB
ALBUMIN SERPL-MCNC: 4.1 G/DL (ref 3.8–4.9)
ALBUMIN/GLOB SERPL: 1.3 {RATIO} (ref 1.2–2.2)
ALP SERPL-CCNC: 111 IU/L (ref 44–121)
ALT SERPL-CCNC: 18 IU/L (ref 0–44)
AST SERPL-CCNC: 21 IU/L (ref 0–40)
BASOPHILS # BLD AUTO: 0 X10E3/UL (ref 0–0.2)
BASOPHILS NFR BLD AUTO: 0 %
BILIRUB SERPL-MCNC: 0.3 MG/DL (ref 0–1.2)
BUN SERPL-MCNC: 16 MG/DL (ref 6–24)
BUN/CREAT SERPL: 17 (ref 9–20)
CALCIUM SERPL-MCNC: 9.3 MG/DL (ref 8.7–10.2)
CHLORIDE SERPL-SCNC: 100 MMOL/L (ref 96–106)
CHOLEST SERPL-MCNC: 212 MG/DL (ref 100–199)
CO2 SERPL-SCNC: 26 MMOL/L (ref 20–29)
CREAT SERPL-MCNC: 0.93 MG/DL (ref 0.76–1.27)
EGFRCR SERPLBLD CKD-EPI 2021: 98 ML/MIN/1.73
EOSINOPHIL # BLD AUTO: 0.3 X10E3/UL (ref 0–0.4)
EOSINOPHIL NFR BLD AUTO: 3 %
ERYTHROCYTE [DISTWIDTH] IN BLOOD BY AUTOMATED COUNT: 14.3 % (ref 11.6–15.4)
GLOBULIN SER CALC-MCNC: 3.1 G/DL (ref 1.5–4.5)
GLUCOSE SERPL-MCNC: 117 MG/DL (ref 70–99)
HBA1C MFR BLD: 5.9 % (ref 4.8–5.6)
HCT VFR BLD AUTO: 40.2 % (ref 37.5–51)
HCV IGG SERPL QL IA: NON REACTIVE
HDLC SERPL-MCNC: 48 MG/DL
HGB BLD-MCNC: 12.9 G/DL (ref 13–17.7)
IMM GRANULOCYTES # BLD AUTO: 0.1 X10E3/UL (ref 0–0.1)
IMM GRANULOCYTES NFR BLD AUTO: 1 %
LDLC SERPL CALC-MCNC: 140 MG/DL (ref 0–99)
LYMPHOCYTES # BLD AUTO: 2.1 X10E3/UL (ref 0.7–3.1)
LYMPHOCYTES NFR BLD AUTO: 21 %
MCH RBC QN AUTO: 25.5 PG (ref 26.6–33)
MCHC RBC AUTO-ENTMCNC: 32.1 G/DL (ref 31.5–35.7)
MCV RBC AUTO: 79 FL (ref 79–97)
MONOCYTES # BLD AUTO: 0.6 X10E3/UL (ref 0.1–0.9)
MONOCYTES NFR BLD AUTO: 6 %
NEUTROPHILS # BLD AUTO: 7.1 X10E3/UL (ref 1.4–7)
NEUTROPHILS NFR BLD AUTO: 69 %
PLATELET # BLD AUTO: 245 X10E3/UL (ref 150–450)
POTASSIUM SERPL-SCNC: 4.3 MMOL/L (ref 3.5–5.2)
PROT SERPL-MCNC: 7.2 G/DL (ref 6–8.5)
RBC # BLD AUTO: 5.06 X10E6/UL (ref 4.14–5.8)
SODIUM SERPL-SCNC: 139 MMOL/L (ref 134–144)
TRIGL SERPL-MCNC: 136 MG/DL (ref 0–149)
TSH SERPL DL<=0.005 MIU/L-ACNC: 3.29 UIU/ML (ref 0.45–4.5)
VLDLC SERPL CALC-MCNC: 24 MG/DL (ref 5–40)
WBC # BLD AUTO: 10 X10E3/UL (ref 3.4–10.8)

## 2023-09-26 ENCOUNTER — OFFICE VISIT (OUTPATIENT)
Dept: ONCOLOGY | Facility: CLINIC | Age: 53
End: 2023-09-26
Payer: COMMERCIAL

## 2023-09-26 VITALS
HEART RATE: 61 BPM | DIASTOLIC BLOOD PRESSURE: 82 MMHG | WEIGHT: 315 LBS | HEIGHT: 75 IN | RESPIRATION RATE: 18 BRPM | BODY MASS INDEX: 39.17 KG/M2 | SYSTOLIC BLOOD PRESSURE: 146 MMHG | OXYGEN SATURATION: 93 % | TEMPERATURE: 97.7 F

## 2023-09-26 DIAGNOSIS — E61.1 DIETARY IRON DEFICIENCY WITHOUT ANEMIA: ICD-10-CM

## 2023-09-26 DIAGNOSIS — D72.829 LEUKOCYTOSIS, UNSPECIFIED TYPE: Primary | Chronic | ICD-10-CM

## 2023-09-26 PROCEDURE — 99214 OFFICE O/P EST MOD 30 MIN: CPT | Performed by: INTERNAL MEDICINE

## 2023-09-26 NOTE — PROGRESS NOTES
CHIEF COMPLAINT: No new somatic complaints    Problem List:  Oncology/Hematology History Overview Note   1.  Reactive leukocytosis resolved  2.  Sleep apnea  3.  History of left tibial fracture  4.  Hypertension  5.  Asthma and allergies  6.  Hypertension  7.  Oncoming microcytosis without anemia    Hematology history timeline:  -1/24/2022 white count 18,000 hemoglobin 12.8 platelets 235,000 no nucleated red cells.  -11/30/2022 white count 13,500 hemoglobin 13 platelets 325,000 unremarkable differential.  -2/13/2023 white count 11,300 hemoglobin 13.2 platelets 272,000 with unremarkable differential    -3/21/2023 [facility] hematology consult: Patient had traumatic tibial fracture after training rail wooden ties fell on him.  Had pinning of his left tibia.  Chronic pain.  Slowly improving white count over the last year since surgery for this.  Rest of his counts are unremarkable and his differential is unremarkable and alkaline phosphatase back in the fall was minimally elevated.  Given that his white count is approaching normal as of last month we will get his count now and in 3 months and see him back in 6 months along with a sedimentation rate and C-reactive protein and as long as the trend continues I would just assume this to be reactive at the age of 52 and not put him through further work-up.  Odds of a low-grade malignant hematologic process slowly improving spontaneously very low.  Odds of a high-grade malignant hematologic process slowly improving without intervention extremely unlikely.    -3/21/2023 white count 12,590.  Sedimentation rate 82.  C-reactive protein 3.2 both marked elevation  -8/25/2023 white count 11,400  -9/22/2023 white count normal 10,000 with hemoglobin 12.9 MCV low end of normal 79.  Glucose 117 otherwise unremarkable CMP.     Leucocytosis       HISTORY OF PRESENT ILLNESS:  The patient is a 53 y.o. male, here for follow up on management of reactive leukocytosis resolving    Past Medical  "History:   Diagnosis Date    Hypertension      No past surgical history on file.    No Known Allergies    Family History and Social History reviewed and changed as necessary    REVIEW OF SYSTEM:   No new somatic complaints    PHYSICAL EXAM:  No rashes or synovitis.  No respiratory distress    Vitals:    09/26/23 0825   BP: 146/82   Pulse: 61   Resp: 18   Temp: 97.7 °F (36.5 °C)   SpO2: 93%   Weight: (!) 179 kg (394 lb)   Height: 190.5 cm (75\")     Vitals:    09/26/23 0825   PainSc: 0-No pain          ECOG score: 0           Vitals reviewed.    ECOG: (0) Fully Active - Able to Carry On All Pre-disease Performance Without Restriction    Lab Results   Component Value Date    HGB 12.9 (L) 09/22/2023    HCT 40.2 09/22/2023    MCV 79 09/22/2023     09/22/2023    WBC 10.0 09/22/2023    NEUTROABS 7.1 (H) 09/22/2023    LYMPHSABS 2.1 09/22/2023    MONOSABS 0.6 09/22/2023    EOSABS 0.3 09/22/2023    BASOSABS 0.0 09/22/2023       Lab Results   Component Value Date    GLUCOSE 117 (H) 09/22/2023    BUN 16 09/22/2023    CREATININE 0.93 09/22/2023     09/22/2023    K 4.3 09/22/2023     09/22/2023    CO2 26 09/22/2023    CALCIUM 9.3 09/22/2023    ALBUMIN 4.1 09/22/2023    BILITOT 0.3 09/22/2023    ALKPHOS 111 09/22/2023    AST 21 09/22/2023    ALT 18 09/22/2023             ASSESSMENT & PLAN:  1.  Reactive leukocytosis resolved  2.  Sleep apnea  3.  History of left tibial fracture  4.  Hypertension  5.  Asthma and allergies  6.  Hypertension  7.  Oncoming microcytosis without anemia    Hematology history timeline:  -1/24/2022 white count 18,000 hemoglobin 12.8 platelets 235,000 no nucleated red cells.  -11/30/2022 white count 13,500 hemoglobin 13 platelets 325,000 unremarkable differential.  -2/13/2023 white count 11,300 hemoglobin 13.2 platelets 272,000 with unremarkable differential    -3/21/2023 [facility] hematology consult: Patient had traumatic tibial fracture after training rail wooden ties fell on him.  Had " pinning of his left tibia.  Chronic pain.  Slowly improving white count over the last year since surgery for this.  Rest of his counts are unremarkable and his differential is unremarkable and alkaline phosphatase back in the fall was minimally elevated.  Given that his white count is approaching normal as of last month we will get his count now and in 3 months and see him back in 6 months along with a sedimentation rate and C-reactive protein and as long as the trend continues I would just assume this to be reactive at the age of 52 and not put him through further work-up.  Odds of a low-grade malignant hematologic process slowly improving spontaneously very low.  Odds of a high-grade malignant hematologic process slowly improving without intervention extremely unlikely.    -3/21/2023 white count 12,590.  Sedimentation rate 82.  C-reactive protein 3.2 both marked elevation  -8/25/2023 white count 11,400  -9/22/2023 white count normal 10,000 with hemoglobin 12.9 MCV low end of normal 79.  Glucose 117 otherwise unremarkable CMP.    -9/26/2023 [facility] hematology follow-up: As expected, with patient's, his inflammatory reactive leukocytosis has resolved and needs no further work-up and clearly had inflammatory indices as outlined above.  What I do see however is an MCV and hemoglobin starting to drop.  Colonoscopy last year but if his current iron disease are low then he will need repeat colonoscopy as well as an EGD.  Previous scopes done in [place].    Total time of care inclusive of time spent today prior to patient's arrival reviewing interval labs and data as outlined above and during visit interviewing him as to signs or symptoms of his posttraumatic inflammatory conditions and improvement thereof clinically and hematologically but with the new finding of decreasing MCV and hemoglobin and need for work-up thereof and after visit instituting this work-up took 30 minutes of patient care time throughout the day  today.  Zachery Milian MD    09/26/2023

## 2023-09-26 NOTE — LETTER
September 26, 2023       No Recipients    Patient: Zane Rebollar   YOB: 1970   Date of Visit: 9/26/2023     Dear ZACK Felix:       Thank you for referring Zane Rebollar to me for evaluation. Below are the relevant portions of my assessment and plan of care.    If you have questions, please do not hesitate to call me. I look forward to following Zane along with you.         Sincerely,        Zachery Milian MD        CC:   No Recipients    Zachery Milian MD  09/26/23 0850  Sign when Signing Visit  CHIEF COMPLAINT: No new somatic complaints    Problem List:  Oncology/Hematology History Overview Note   1.  Reactive leukocytosis resolved  2.  Sleep apnea  3.  History of left tibial fracture  4.  Hypertension  5.  Asthma and allergies  6.  Hypertension  7.  Oncoming microcytosis without anemia    Hematology history timeline:  -1/24/2022 white count 18,000 hemoglobin 12.8 platelets 235,000 no nucleated red cells.  -11/30/2022 white count 13,500 hemoglobin 13 platelets 325,000 unremarkable differential.  -2/13/2023 white count 11,300 hemoglobin 13.2 platelets 272,000 with unremarkable differential    -3/21/2023 [facility] hematology consult: Patient had traumatic tibial fracture after training rail wooden ties fell on him.  Had pinning of his left tibia.  Chronic pain.  Slowly improving white count over the last year since surgery for this.  Rest of his counts are unremarkable and his differential is unremarkable and alkaline phosphatase back in the fall was minimally elevated.  Given that his white count is approaching normal as of last month we will get his count now and in 3 months and see him back in 6 months along with a sedimentation rate and C-reactive protein and as long as the trend continues I would just assume this to be reactive at the age of 52 and not put him through further work-up.  Odds of a low-grade malignant hematologic process slowly improving spontaneously very low.  Odds of a  "high-grade malignant hematologic process slowly improving without intervention extremely unlikely.    -3/21/2023 white count 12,590.  Sedimentation rate 82.  C-reactive protein 3.2 both marked elevation  -8/25/2023 white count 11,400  -9/22/2023 white count normal 10,000 with hemoglobin 12.9 MCV low end of normal 79.  Glucose 117 otherwise unremarkable CMP.     Leucocytosis       HISTORY OF PRESENT ILLNESS:  The patient is a 53 y.o. male, here for follow up on management of reactive leukocytosis resolving    Past Medical History:   Diagnosis Date   • Hypertension      No past surgical history on file.    No Known Allergies    Family History and Social History reviewed and changed as necessary    REVIEW OF SYSTEM:   No new somatic complaints    PHYSICAL EXAM:  No rashes or synovitis.  No respiratory distress    Vitals:    09/26/23 0825   BP: 146/82   Pulse: 61   Resp: 18   Temp: 97.7 °F (36.5 °C)   SpO2: 93%   Weight: (!) 179 kg (394 lb)   Height: 190.5 cm (75\")     Vitals:    09/26/23 0825   PainSc: 0-No pain          ECOG score: 0           Vitals reviewed.    ECOG: (0) Fully Active - Able to Carry On All Pre-disease Performance Without Restriction    Lab Results   Component Value Date    HGB 12.9 (L) 09/22/2023    HCT 40.2 09/22/2023    MCV 79 09/22/2023     09/22/2023    WBC 10.0 09/22/2023    NEUTROABS 7.1 (H) 09/22/2023    LYMPHSABS 2.1 09/22/2023    MONOSABS 0.6 09/22/2023    EOSABS 0.3 09/22/2023    BASOSABS 0.0 09/22/2023       Lab Results   Component Value Date    GLUCOSE 117 (H) 09/22/2023    BUN 16 09/22/2023    CREATININE 0.93 09/22/2023     09/22/2023    K 4.3 09/22/2023     09/22/2023    CO2 26 09/22/2023    CALCIUM 9.3 09/22/2023    ALBUMIN 4.1 09/22/2023    BILITOT 0.3 09/22/2023    ALKPHOS 111 09/22/2023    AST 21 09/22/2023    ALT 18 09/22/2023             ASSESSMENT & PLAN:  1.  Reactive leukocytosis resolved  2.  Sleep apnea  3.  History of left tibial fracture  4.  " Hypertension  5.  Asthma and allergies  6.  Hypertension  7.  Oncoming microcytosis without anemia    Hematology history timeline:  -1/24/2022 white count 18,000 hemoglobin 12.8 platelets 235,000 no nucleated red cells.  -11/30/2022 white count 13,500 hemoglobin 13 platelets 325,000 unremarkable differential.  -2/13/2023 white count 11,300 hemoglobin 13.2 platelets 272,000 with unremarkable differential    -3/21/2023 [facility] hematology consult: Patient had traumatic tibial fracture after training rail wooden ties fell on him.  Had pinning of his left tibia.  Chronic pain.  Slowly improving white count over the last year since surgery for this.  Rest of his counts are unremarkable and his differential is unremarkable and alkaline phosphatase back in the fall was minimally elevated.  Given that his white count is approaching normal as of last month we will get his count now and in 3 months and see him back in 6 months along with a sedimentation rate and C-reactive protein and as long as the trend continues I would just assume this to be reactive at the age of 52 and not put him through further work-up.  Odds of a low-grade malignant hematologic process slowly improving spontaneously very low.  Odds of a high-grade malignant hematologic process slowly improving without intervention extremely unlikely.    -3/21/2023 white count 12,590.  Sedimentation rate 82.  C-reactive protein 3.2 both marked elevation  -8/25/2023 white count 11,400  -9/22/2023 white count normal 10,000 with hemoglobin 12.9 MCV low end of normal 79.  Glucose 117 otherwise unremarkable CMP.    -9/26/2023 [facility] follow-up: As expected, with patient's, his inflammatory reactive leukocytosis has resolved and needs no further work-up and clearly had inflammatory indices as outlined above.  What I do see however is an MCV and hemoglobin starting to drop.  Colonoscopy last year but if his current iron disease are low then he will need repeat  colonoscopy as well as an EGD.  Previous scopes done in [place].    Total time of care inclusive of time spent today prior to patient's arrival reviewing interval labs and data as outlined above and during visit interviewing him as to signs or symptoms of his posttraumatic inflammatory conditions and improvement thereof clinically and hematologically but with the new finding of decreasing MCV and hemoglobin and need for work-up thereof and after visit instituting this work-up took 30 minutes of patient care time throughout the day today.  Zachery Milian MD    09/26/2023

## 2023-09-27 LAB
FERRITIN SERPL-MCNC: 331 NG/ML (ref 30–400)
IRON SATN MFR SERPL: 17 % (ref 20–50)
IRON SERPL-MCNC: 66 MCG/DL (ref 59–158)
PSA SERPL-MCNC: 0.9 NG/ML (ref 0–4)
TIBC SERPL-MCNC: 393 MCG/DL
UIBC SERPL-MCNC: 327 MCG/DL (ref 112–346)
WRITTEN AUTHORIZATION: NORMAL

## 2023-10-10 ENCOUNTER — OFFICE VISIT (OUTPATIENT)
Dept: ONCOLOGY | Facility: CLINIC | Age: 53
End: 2023-10-10
Payer: COMMERCIAL

## 2023-10-10 VITALS
RESPIRATION RATE: 20 BRPM | TEMPERATURE: 97.7 F | BODY MASS INDEX: 39.17 KG/M2 | OXYGEN SATURATION: 96 % | HEART RATE: 63 BPM | SYSTOLIC BLOOD PRESSURE: 140 MMHG | DIASTOLIC BLOOD PRESSURE: 84 MMHG | WEIGHT: 315 LBS | HEIGHT: 75 IN

## 2023-10-10 DIAGNOSIS — D72.828 OTHER ELEVATED WHITE BLOOD CELL (WBC) COUNT: Primary | Chronic | ICD-10-CM

## 2023-10-10 PROCEDURE — 99213 OFFICE O/P EST LOW 20 MIN: CPT | Performed by: INTERNAL MEDICINE

## 2023-10-10 NOTE — LETTER
October 10, 2023       No Recipients    Patient: Zane Rebollar   YOB: 1970   Date of Visit: 10/10/2023     Dear ZACK Felix:       Thank you for referring Zane Rebollar to me for evaluation. Below are the relevant portions of my assessment and plan of care.    If you have questions, please do not hesitate to call me. I look forward to following Zane along with you.         Sincerely,        Zachery Milian MD        CC:   No Recipients    Zachery Milian MD  10/10/23 1514  Sign when Signing Visit  CHIEF COMPLAINT: No new somatic complaints    Problem List:  Oncology/Hematology History Overview Note   1.  Reactive leukocytosis resolved  2.  Sleep apnea  3.  History of left tibial fracture  4.  Hypertension  5.  Asthma and allergies  6.  Hypertension  7.  Oncoming microcytosis without anemia    Hematology history timeline:  -1/24/2022 white count 18,000 hemoglobin 12.8 platelets 235,000 no nucleated red cells.  -11/30/2022 white count 13,500 hemoglobin 13 platelets 325,000 unremarkable differential.  -2/13/2023 white count 11,300 hemoglobin 13.2 platelets 272,000 with unremarkable differential    -3/21/2023 [facility] consult: Patient had traumatic tibial fracture after training rail wooden ties fell on him.  Had pinning of his left tibia.  Chronic pain.  Slowly improving white count over the last year since surgery for this.  Rest of his counts are unremarkable and his differential is unremarkable and alkaline phosphatase back in the fall was minimally elevated.  Given that his white count is approaching normal as of last month we will get his count now and in 3 months and see him back in 6 months along with a sedimentation rate and C-reactive protein and as long as the trend continues I would just assume this to be reactive at the age of 52 and not put him through further work-up.  Odds of a low-grade malignant hematologic process slowly improving spontaneously very low.  Odds of a  high-grade malignant hematologic process slowly improving without intervention extremely unlikely.    -3/21/2023 white count 12,590.  Sedimentation rate 82.  C-reactive protein 3.2 both marked elevation  -8/25/2023 white count 11,400  -9/22/2023 white count normal 10,000 with hemoglobin 12.9 MCV low end of normal 79.  Glucose 117 otherwise unremarkable CMP.    -9/26/2023 [facility] follow-up: As expected, with patient's history, his inflammatory reactive leukocytosis has resolved and needs no further work-up and clearly had inflammatory indices as outlined above.  What I do see however is an MCV and hemoglobin starting to drop.  Colonoscopy last year but if his current iron indices are low then he will need repeat colonoscopy as well as an EGD.  Previous scopes done in [place].    -9/26/2023 ferritin normal 331 .  Iron normal 66 with saturation normal 17%.  Normal total iron-binding capacity 393.    -10/10/2023 [facility] follow-up: As previously stated, inflammatory leukocytosis resolved.  No iron deficiency at this junction with normal iron and ferritin midrange normal though the ferritin as an acute phase reactant can be falsely increased into the normal range but the total iron-binding capacity is also normal.  I would simply asked his primary care to repeat CBC and iron indices in 6 months and if the MCV and hemoglobin are falling and quarterly iron indices are dropping into the iron deficiency range and he needs repeat colonoscopy and EGD and oral iron supplementation.  I also told him it would be easy to have [facility] do a Cologuard just for thoroughness in the absence of repeating endoscopy given relatively recent colonoscopy.    I will see on an as-needed basis.     Leucocytosis       HISTORY OF PRESENT ILLNESS:  The patient is a 53 y.o. male, here for follow up on management of resolved leukocytosis with mild decline in hemoglobin and MCV with normal iron indices    Past Medical  "History:   Diagnosis Date    Hypertension      No past surgical history on file.    No Known Allergies    Family History and Social History reviewed and changed as necessary    REVIEW OF SYSTEM:   No change in the color caliber or consistency of his stools    PHYSICAL EXAM:  No jaundice icterus or pallor.  No respiratory distress.    Vitals:    10/10/23 1501   BP: 140/84   Pulse: 63   Resp: 20   Temp: 97.7 øF (36.5 øC)   SpO2: 96%   Weight: (!) 179 kg (394 lb)   Height: 190.5 cm (75\")     Vitals:    10/10/23 1501   PainSc: 0-No pain          ECOG score: 0           Vitals reviewed.    ECOG: (0) Fully Active - Able to Carry On All Pre-disease Performance Without Restriction    Lab Results   Component Value Date    HGB 12.9 (L) 09/22/2023    HCT 40.2 09/22/2023    MCV 79 09/22/2023     09/22/2023    WBC 10.0 09/22/2023    NEUTROABS 7.1 (H) 09/22/2023    LYMPHSABS 2.1 09/22/2023    MONOSABS 0.6 09/22/2023    EOSABS 0.3 09/22/2023    BASOSABS 0.0 09/22/2023       Lab Results   Component Value Date    GLUCOSE 117 (H) 09/22/2023    BUN 16 09/22/2023    CREATININE 0.93 09/22/2023     09/22/2023    K 4.3 09/22/2023     09/22/2023    CO2 26 09/22/2023    CALCIUM 9.3 09/22/2023    ALBUMIN 4.1 09/22/2023    BILITOT 0.3 09/22/2023    ALKPHOS 111 09/22/2023    AST 21 09/22/2023    ALT 18 09/22/2023             ASSESSMENT & PLAN:    Zachery Milian MD    10/10/2023    CHIEF COMPLAINT: No new somatic complaints    Problem List:  Oncology/Hematology History Overview Note   1.  Reactive leukocytosis resolved  2.  Sleep apnea  3.  History of left tibial fracture  4.  Hypertension  5.  Asthma and allergies  6.  Hypertension  7.  Oncoming microcytosis without anemia    Hematology history timeline:  -1/24/2022 white count 18,000 hemoglobin 12.8 platelets 235,000 no nucleated red cells.  -11/30/2022 white count 13,500 hemoglobin 13 platelets 325,000 unremarkable differential.  -2/13/2023 white count 11,300 hemoglobin " 13.2 platelets 272,000 with unremarkable differential    -3/21/2023 [facility] consult: Patient had traumatic tibial fracture after training rail wooden ties fell on him.  Had pinning of his left tibia.  Chronic pain.  Slowly improving white count over the last year since surgery for this.  Rest of his counts are unremarkable and his differential is unremarkable and alkaline phosphatase back in the fall was minimally elevated.  Given that his white count is approaching normal as of last month we will get his count now and in 3 months and see him back in 6 months along with a sedimentation rate and C-reactive protein and as long as the trend continues I would just assume this to be reactive at the age of 52 and not put him through further work-up.  Odds of a low-grade malignant hematologic process slowly improving spontaneously very low.  Odds of a high-grade malignant hematologic process slowly improving without intervention extremely unlikely.    -3/21/2023 white count 12,590.  Sedimentation rate 82.  C-reactive protein 3.2 both marked elevation  -8/25/2023 white count 11,400  -9/22/2023 white count normal 10,000 with hemoglobin 12.9 MCV low end of normal 79.  Glucose 117 otherwise unremarkable CMP.    -9/26/2023 [facility] follow-up: As expected, with patient's history, his inflammatory reactive leukocytosis has resolved and needs no further work-up and clearly had inflammatory indices as outlined above.  What I do see however is an MCV and hemoglobin starting to drop.  Colonoscopy last year but if his current iron indices are low then he will need repeat colonoscopy as well as an EGD.  Previous scopes done in [place].    -9/26/2023 ferritin normal 331 .  Iron normal 66 with saturation normal 17%.  Normal total iron-binding capacity 393.    -10/10/2023 [facility] follow-up: As previously stated, inflammatory leukocytosis resolved.  No iron deficiency at this junction with normal iron and  "ferritin midrange normal though the ferritin as an acute phase reactant can be falsely increased into the normal range but the total iron-binding capacity is also normal.  I would simply asked his primary care to repeat CBC and iron indices in 6 months and if the MCV and hemoglobin are falling and quarterly iron indices are dropping into the iron deficiency range and he needs repeat colonoscopy and EGD and oral iron supplementation.  I also told him it would be easy to have [facility] do a Cologuard just for thoroughness in the absence of repeating endoscopy given relatively recent colonoscopy.    I will see on an as-needed basis.     Leucocytosis       HISTORY OF PRESENT ILLNESS:  The patient is a 53 y.o. male, here for follow up on management of resolved leukocytosis with mild decrease in MCV and hemoglobin with normal iron indices    Past Medical History:   Diagnosis Date    Hypertension      No past surgical history on file.    No Known Allergies    Family History and Social History reviewed and changed as necessary    REVIEW OF SYSTEM:   No new somatic complaints.  No change in the color caliber or consistency of stools.    PHYSICAL EXAM:  No jaundice icterus or pallor.    Vitals:    10/10/23 1501   BP: 140/84   Pulse: 63   Resp: 20   Temp: 97.7 øF (36.5 øC)   SpO2: 96%   Weight: (!) 179 kg (394 lb)   Height: 190.5 cm (75\")     Vitals:    10/10/23 1501   PainSc: 0-No pain          ECOG score: 0           Vitals reviewed.    ECOG: (0) Fully Active - Able to Carry On All Pre-disease Performance Without Restriction    Lab Results   Component Value Date    HGB 12.9 (L) 09/22/2023    HCT 40.2 09/22/2023    MCV 79 09/22/2023     09/22/2023    WBC 10.0 09/22/2023    NEUTROABS 7.1 (H) 09/22/2023    LYMPHSABS 2.1 09/22/2023    MONOSABS 0.6 09/22/2023    EOSABS 0.3 09/22/2023    BASOSABS 0.0 09/22/2023       Lab Results   Component Value Date    GLUCOSE 117 (H) 09/22/2023    BUN 16 09/22/2023    " CREATININE 0.93 09/22/2023     09/22/2023    K 4.3 09/22/2023     09/22/2023    CO2 26 09/22/2023    CALCIUM 9.3 09/22/2023    ALBUMIN 4.1 09/22/2023    BILITOT 0.3 09/22/2023    ALKPHOS 111 09/22/2023    AST 21 09/22/2023    ALT 18 09/22/2023             ASSESSMENT & PLAN:  1.  Reactive leukocytosis resolved  2.  Sleep apnea  3.  History of left tibial fracture  4.  Hypertension  5.  Asthma and allergies  6.  Hypertension  7.  Oncoming microcytosis without anemia    Hematology history timeline:  -1/24/2022 white count 18,000 hemoglobin 12.8 platelets 235,000 no nucleated red cells.  -11/30/2022 white count 13,500 hemoglobin 13 platelets 325,000 unremarkable differential.  -2/13/2023 white count 11,300 hemoglobin 13.2 platelets 272,000 with unremarkable differential    -3/21/2023 [facility] consult: Patient had traumatic tibial fracture after training rail wooden ties fell on him.  Had pinning of his left tibia.  Chronic pain.  Slowly improving white count over the last year since surgery for this.  Rest of his counts are unremarkable and his differential is unremarkable and alkaline phosphatase back in the fall was minimally elevated.  Given that his white count is approaching normal as of last month we will get his count now and in 3 months and see him back in 6 months along with a sedimentation rate and C-reactive protein and as long as the trend continues I would just assume this to be reactive at the age of 52 and not put him through further work-up.  Odds of a low-grade malignant hematologic process slowly improving spontaneously very low.  Odds of a high-grade malignant hematologic process slowly improving without intervention extremely unlikely.    -3/21/2023 white count 12,590.  Sedimentation rate 82.  C-reactive protein 3.2 both marked elevation  -8/25/2023 white count 11,400  -9/22/2023 white count normal 10,000 with hemoglobin 12.9 MCV low end of normal 79.  Glucose 117 otherwise  unremarkable CMP.    -9/26/2023 [facility] follow-up: As expected, with patient's history, his inflammatory reactive leukocytosis has resolved and needs no further work-up and clearly had inflammatory indices as outlined above.  What I do see however is an MCV and hemoglobin starting to drop.  Colonoscopy last year but if his current iron indices are low then he will need repeat colonoscopy as well as an EGD.  Previous scopes done in [place].    -9/26/2023 ferritin normal 331 .  Iron normal 66 with saturation normal 17%.  Normal total iron-binding capacity 393.    -10/10/2023 [facility] follow-up: As previously stated, inflammatory leukocytosis resolved.  No iron deficiency at this junction with normal iron and ferritin midrange normal though the ferritin as an acute phase reactant can be falsely increased into the normal range but the total iron-binding capacity is also normal.  I would simply asked his primary care to repeat CBC and iron indices in 6 months and if the MCV and hemoglobin are falling and quarterly iron indices are dropping into the iron deficiency range and he needs repeat colonoscopy and EGD and oral iron supplementation.  I also told him it would be easy to have [facility] do a Cologuard just for thoroughness in the absence of repeating endoscopy given relatively recent colonoscopy.  He sees Dr. David December 5.  I will see on an as-needed basis.    Total time of care today inclusive of time spent today prior to patient's arrival reviewing interval data and during visit interviewing him as to signs or symptoms of potential inflammatory and iron deficiency-inducing conditions of which he has none ongoing and after visit instituting the plan as outlined above took 15 minutes of patient care time throughout the day today.  Zachery Milian MD    10/10/2023

## 2023-10-10 NOTE — PROGRESS NOTES
CHIEF COMPLAINT: No new somatic complaints    Problem List:  Oncology/Hematology History Overview Note   1.  Reactive leukocytosis resolved  2.  Sleep apnea  3.  History of left tibial fracture  4.  Hypertension  5.  Asthma and allergies  6.  Hypertension  7.  Oncoming microcytosis without anemia    Hematology history timeline:  -1/24/2022 white count 18,000 hemoglobin 12.8 platelets 235,000 no nucleated red cells.  -11/30/2022 white count 13,500 hemoglobin 13 platelets 325,000 unremarkable differential.  -2/13/2023 white count 11,300 hemoglobin 13.2 platelets 272,000 with unremarkable differential    -3/21/2023 [facility] hematology consult: Patient had traumatic tibial fracture after training rail wooden ties fell on him.  Had pinning of his left tibia.  Chronic pain.  Slowly improving white count over the last year since surgery for this.  Rest of his counts are unremarkable and his differential is unremarkable and alkaline phosphatase back in the fall was minimally elevated.  Given that his white count is approaching normal as of last month we will get his count now and in 3 months and see him back in 6 months along with a sedimentation rate and C-reactive protein and as long as the trend continues I would just assume this to be reactive at the age of 52 and not put him through further work-up.  Odds of a low-grade malignant hematologic process slowly improving spontaneously very low.  Odds of a high-grade malignant hematologic process slowly improving without intervention extremely unlikely.    -3/21/2023 white count 12,590.  Sedimentation rate 82.  C-reactive protein 3.2 both marked elevation  -8/25/2023 white count 11,400  -9/22/2023 white count normal 10,000 with hemoglobin 12.9 MCV low end of normal 79.  Glucose 117 otherwise unremarkable CMP.    -9/26/2023 [facility] hematology follow-up: As expected, with patient's history, his inflammatory reactive leukocytosis has resolved and needs no further work-up and  "clearly had inflammatory indices as outlined above.  What I do see however is an MCV and hemoglobin starting to drop.  Colonoscopy last year but if his current iron indices are low then he will need repeat colonoscopy as well as an EGD.  Previous scopes done in [place].    -9/26/2023 ferritin normal 331 .  Iron normal 66 with saturation normal 17%.  Normal total iron-binding capacity 393.    -10/10/2023 [facility] follow-up: As previously stated, inflammatory leukocytosis resolved.  No iron deficiency at this junction with normal iron and ferritin midrange normal though the ferritin as an acute phase reactant can be falsely increased into the normal range but the total iron-binding capacity is also normal.  I would simply asked his primary care to repeat CBC and iron indices in 6 months and if the MCV and hemoglobin are falling and quarterly iron indices are dropping into the iron deficiency range and he needs repeat colonoscopy and EGD and oral iron supplementation.  I also told him it would be easy to have [facility] Dumont do a Cologuard just for thoroughness in the absence of repeating endoscopy given relatively recent colonoscopy.    I will see on an as-needed basis.     Leucocytosis       HISTORY OF PRESENT ILLNESS:  The patient is a 53 y.o. male, here for follow up on management of resolved leukocytosis with mild decline in hemoglobin and MCV with normal iron indices    Past Medical History:   Diagnosis Date    Hypertension      No past surgical history on file.    No Known Allergies    Family History and Social History reviewed and changed as necessary    REVIEW OF SYSTEM:   No change in the color caliber or consistency of his stools    PHYSICAL EXAM:  No jaundice icterus or pallor.  No respiratory distress.    Vitals:    10/10/23 1501   BP: 140/84   Pulse: 63   Resp: 20   Temp: 97.7 øF (36.5 øC)   SpO2: 96%   Weight: (!) 179 kg (394 lb)   Height: 190.5 cm (75\")     Vitals:    10/10/23 1501 "   PainSc: 0-No pain          ECOG score: 0           Vitals reviewed.    ECOG: (0) Fully Active - Able to Carry On All Pre-disease Performance Without Restriction    Lab Results   Component Value Date    HGB 12.9 (L) 09/22/2023    HCT 40.2 09/22/2023    MCV 79 09/22/2023     09/22/2023    WBC 10.0 09/22/2023    NEUTROABS 7.1 (H) 09/22/2023    LYMPHSABS 2.1 09/22/2023    MONOSABS 0.6 09/22/2023    EOSABS 0.3 09/22/2023    BASOSABS 0.0 09/22/2023       Lab Results   Component Value Date    GLUCOSE 117 (H) 09/22/2023    BUN 16 09/22/2023    CREATININE 0.93 09/22/2023     09/22/2023    K 4.3 09/22/2023     09/22/2023    CO2 26 09/22/2023    CALCIUM 9.3 09/22/2023    ALBUMIN 4.1 09/22/2023    BILITOT 0.3 09/22/2023    ALKPHOS 111 09/22/2023    AST 21 09/22/2023    ALT 18 09/22/2023             ASSESSMENT & PLAN:    Zachery Milian MD    10/10/2023    CHIEF COMPLAINT: No new somatic complaints    Problem List:  Oncology/Hematology History Overview Note   1.  Reactive leukocytosis resolved  2.  Sleep apnea  3.  History of left tibial fracture  4.  Hypertension  5.  Asthma and allergies  6.  Hypertension  7.  Oncoming microcytosis without anemia    Hematology history timeline:  -1/24/2022 white count 18,000 hemoglobin 12.8 platelets 235,000 no nucleated red cells.  -11/30/2022 white count 13,500 hemoglobin 13 platelets 325,000 unremarkable differential.  -2/13/2023 white count 11,300 hemoglobin 13.2 platelets 272,000 with unremarkable differential    -3/21/2023 [facility] hematology consult: Patient had traumatic tibial fracture after training rail wooden ties fell on him.  Had pinning of his left tibia.  Chronic pain.  Slowly improving white count over the last year since surgery for this.  Rest of his counts are unremarkable and his differential is unremarkable and alkaline phosphatase back in the fall was minimally elevated.  Given that his white count is approaching normal as of last month we will get his  count now and in 3 months and see him back in 6 months along with a sedimentation rate and C-reactive protein and as long as the trend continues I would just assume this to be reactive at the age of 52 and not put him through further work-up.  Odds of a low-grade malignant hematologic process slowly improving spontaneously very low.  Odds of a high-grade malignant hematologic process slowly improving without intervention extremely unlikely.    -3/21/2023 white count 12,590.  Sedimentation rate 82.  C-reactive protein 3.2 both marked elevation  -8/25/2023 white count 11,400  -9/22/2023 white count normal 10,000 with hemoglobin 12.9 MCV low end of normal 79.  Glucose 117 otherwise unremarkable CMP.    -9/26/2023 [facility] follow-up: As expected, with patient's history, his inflammatory reactive leukocytosis has resolved and needs no further work-up and clearly had inflammatory indices as outlined above.  What I do see however is an MCV and hemoglobin starting to drop.  Colonoscopy last year but if his current iron indices are low then he will need repeat colonoscopy as well as an EGD.  Previous scopes done in [place].    -9/26/2023 ferritin normal 331 .  Iron normal 66 with saturation normal 17%.  Normal total iron-binding capacity 393.    -10/10/2023 [facility] follow-up: As previously stated, inflammatory leukocytosis resolved.  No iron deficiency at this junction with normal iron and ferritin midrange normal though the ferritin as an acute phase reactant can be falsely increased into the normal range but the total iron-binding capacity is also normal.  I would simply asked his primary care to repeat CBC and iron indices in 6 months and if the MCV and hemoglobin are falling and quarterly iron indices are dropping into the iron deficiency range and he needs repeat colonoscopy and EGD and oral iron supplementation.  I also told him it would be easy to have [facility] do a Cologuard just for  "thoroughness in the absence of repeating endoscopy given relatively recent colonoscopy.    I will see on an as-needed basis.     Leucocytosis       HISTORY OF PRESENT ILLNESS:  The patient is a 53 y.o. male, here for follow up on management of resolved leukocytosis with mild decrease in MCV and hemoglobin with normal iron indices    Past Medical History:   Diagnosis Date    Hypertension      No past surgical history on file.    No Known Allergies    Family History and Social History reviewed and changed as necessary    REVIEW OF SYSTEM:   No new somatic complaints.  No change in the color caliber or consistency of stools.    PHYSICAL EXAM:  No jaundice icterus or pallor.    Vitals:    10/10/23 1501   BP: 140/84   Pulse: 63   Resp: 20   Temp: 97.7 øF (36.5 øC)   SpO2: 96%   Weight: (!) 179 kg (394 lb)   Height: 190.5 cm (75\")     Vitals:    10/10/23 1501   PainSc: 0-No pain          ECOG score: 0           Vitals reviewed.    ECOG: (0) Fully Active - Able to Carry On All Pre-disease Performance Without Restriction    Lab Results   Component Value Date    HGB 12.9 (L) 09/22/2023    HCT 40.2 09/22/2023    MCV 79 09/22/2023     09/22/2023    WBC 10.0 09/22/2023    NEUTROABS 7.1 (H) 09/22/2023    LYMPHSABS 2.1 09/22/2023    MONOSABS 0.6 09/22/2023    EOSABS 0.3 09/22/2023    BASOSABS 0.0 09/22/2023       Lab Results   Component Value Date    GLUCOSE 117 (H) 09/22/2023    BUN 16 09/22/2023    CREATININE 0.93 09/22/2023     09/22/2023    K 4.3 09/22/2023     09/22/2023    CO2 26 09/22/2023    CALCIUM 9.3 09/22/2023    ALBUMIN 4.1 09/22/2023    BILITOT 0.3 09/22/2023    ALKPHOS 111 09/22/2023    AST 21 09/22/2023    ALT 18 09/22/2023             ASSESSMENT & PLAN:  1.  Reactive leukocytosis resolved  2.  Sleep apnea  3.  History of left tibial fracture  4.  Hypertension  5.  Asthma and allergies  6.  Hypertension  7.  Oncoming microcytosis without anemia    Hematology history timeline:  -1/24/2022 white " count 18,000 hemoglobin 12.8 platelets 235,000 no nucleated red cells.  -11/30/2022 white count 13,500 hemoglobin 13 platelets 325,000 unremarkable differential.  -2/13/2023 white count 11,300 hemoglobin 13.2 platelets 272,000 with unremarkable differential    -3/21/2023 [facility] hematology consult: Patient had traumatic tibial fracture after training rail wooden ties fell on him.  Had pinning of his left tibia.  Chronic pain.  Slowly improving white count over the last year since surgery for this.  Rest of his counts are unremarkable and his differential is unremarkable and alkaline phosphatase back in the fall was minimally elevated.  Given that his white count is approaching normal as of last month we will get his count now and in 3 months and see him back in 6 months along with a sedimentation rate and C-reactive protein and as long as the trend continues I would just assume this to be reactive at the age of 52 and not put him through further work-up.  Odds of a low-grade malignant hematologic process slowly improving spontaneously very low.  Odds of a high-grade malignant hematologic process slowly improving without intervention extremely unlikely.    -3/21/2023 white count 12,590.  Sedimentation rate 82.  C-reactive protein 3.2 both marked elevation  -8/25/2023 white count 11,400  -9/22/2023 white count normal 10,000 with hemoglobin 12.9 MCV low end of normal 79.  Glucose 117 otherwise unremarkable CMP.    -9/26/2023 [facility] follow-up: As expected, with patient's history, his inflammatory reactive leukocytosis has resolved and needs no further work-up and clearly had inflammatory indices as outlined above.  What I do see however is an MCV and hemoglobin starting to drop.  Colonoscopy last year but if his current iron indices are low then he will need repeat colonoscopy as well as an EGD.  Previous scopes done in [place].    -9/26/2023 ferritin normal 331 .  Iron normal 66 with saturation normal  17%.  Normal total iron-binding capacity 393.    -10/10/2023 [facility] hematology follow-up: As previously stated, inflammatory leukocytosis resolved.  No iron deficiency at this junction with normal iron and ferritin midrange normal though the ferritin as an acute phase reactant can be falsely increased into the normal range but the total iron-binding capacity is also normal.  I would simply asked his primary care to repeat CBC and iron indices in 6 months and if the MCV and hemoglobin are falling and quarterly iron indices are dropping into the iron deficiency range and he needs repeat colonoscopy and EGD and oral iron supplementation.  I also told him it would be easy to have [facility] Dumont do a Cologuard just for thoroughness in the absence of repeating endoscopy given relatively recent colonoscopy.  He sees Dr. David December 5.  I will see on an as-needed basis.    Total time of care today inclusive of time spent today prior to patient's arrival reviewing interval data and during visit interviewing him as to signs or symptoms of potential inflammatory and iron deficiency-inducing conditions of which he has none ongoing and after visit instituting the plan as outlined above took 15 minutes of patient care time throughout the day today.  Zachery Milian MD    10/10/2023

## 2023-10-10 NOTE — LETTER
October 10, 2023       No Recipients    Patient: Zane Rebollar   YOB: 1970   Date of Visit: 10/10/2023     Dear ZACK Felix:       Thank you for referring Zane Rebollar to me for evaluation. Below are the relevant portions of my assessment and plan of care.    If you have questions, please do not hesitate to call me. I look forward to following Zane along with you.         Sincerely,        Zachery Milian MD        CC:   No Recipients    Zachery Milian MD  10/10/23 1514  Sign when Signing Visit  CHIEF COMPLAINT: No new somatic complaints    Problem List:  Oncology/Hematology History Overview Note   1.  Reactive leukocytosis resolved  2.  Sleep apnea  3.  History of left tibial fracture  4.  Hypertension  5.  Asthma and allergies  6.  Hypertension  7.  Oncoming microcytosis without anemia    Hematology history timeline:  -1/24/2022 white count 18,000 hemoglobin 12.8 platelets 235,000 no nucleated red cells.  -11/30/2022 white count 13,500 hemoglobin 13 platelets 325,000 unremarkable differential.  -2/13/2023 white count 11,300 hemoglobin 13.2 platelets 272,000 with unremarkable differential    -3/21/2023 [facility] consult: Patient had traumatic tibial fracture after training rail wooden ties fell on him.  Had pinning of his left tibia.  Chronic pain.  Slowly improving white count over the last year since surgery for this.  Rest of his counts are unremarkable and his differential is unremarkable and alkaline phosphatase back in the fall was minimally elevated.  Given that his white count is approaching normal as of last month we will get his count now and in 3 months and see him back in 6 months along with a sedimentation rate and C-reactive protein and as long as the trend continues I would just assume this to be reactive at the age of 52 and not put him through further work-up.  Odds of a low-grade malignant hematologic process slowly improving spontaneously very low.  Odds of a  high-grade malignant hematologic process slowly improving without intervention extremely unlikely.    -3/21/2023 white count 12,590.  Sedimentation rate 82.  C-reactive protein 3.2 both marked elevation  -8/25/2023 white count 11,400  -9/22/2023 white count normal 10,000 with hemoglobin 12.9 MCV low end of normal 79.  Glucose 117 otherwise unremarkable CMP.    -9/26/2023 [facility] follow-up: As expected, with patient's history, his inflammatory reactive leukocytosis has resolved and needs no further work-up and clearly had inflammatory indices as outlined above.  What I do see however is an MCV and hemoglobin starting to drop.  Colonoscopy last year but if his current iron indices are low then he will need repeat colonoscopy as well as an EGD.  Previous scopes done in [place].    -9/26/2023 ferritin normal 331 .  Iron normal 66 with saturation normal 17%.  Normal total iron-binding capacity 393.    -10/10/2023 [facility] follow-up: As previously stated, inflammatory leukocytosis resolved.  No iron deficiency at this junction with normal iron and ferritin midrange normal though the ferritin as an acute phase reactant can be falsely increased into the normal range but the total iron-binding capacity is also normal.  I would simply asked his primary care to repeat CBC and iron indices in 6 months and if the MCV and hemoglobin are falling and quarterly iron indices are dropping into the iron deficiency range and he needs repeat colonoscopy and EGD and oral iron supplementation.  I also told him it would be easy to have [facility] Dumont do a Cologuard just for thoroughness in the absence of repeating endoscopy given relatively recent colonoscopy.    I will see on an as-needed basis.     Leucocytosis       HISTORY OF PRESENT ILLNESS:  The patient is a 53 y.o. male, here for follow up on management of resolved leukocytosis with mild decline in hemoglobin and MCV with normal iron indices    Past Medical  "History:   Diagnosis Date    Hypertension      No past surgical history on file.    No Known Allergies    Family History and Social History reviewed and changed as necessary    REVIEW OF SYSTEM:   No change in the color caliber or consistency of his stools    PHYSICAL EXAM:  No jaundice icterus or pallor.  No respiratory distress.    Vitals:    10/10/23 1501   BP: 140/84   Pulse: 63   Resp: 20   Temp: 97.7 øF (36.5 øC)   SpO2: 96%   Weight: (!) 179 kg (394 lb)   Height: 190.5 cm (75\")     Vitals:    10/10/23 1501   PainSc: 0-No pain          ECOG score: 0           Vitals reviewed.    ECOG: (0) Fully Active - Able to Carry On All Pre-disease Performance Without Restriction    Lab Results   Component Value Date    HGB 12.9 (L) 09/22/2023    HCT 40.2 09/22/2023    MCV 79 09/22/2023     09/22/2023    WBC 10.0 09/22/2023    NEUTROABS 7.1 (H) 09/22/2023    LYMPHSABS 2.1 09/22/2023    MONOSABS 0.6 09/22/2023    EOSABS 0.3 09/22/2023    BASOSABS 0.0 09/22/2023       Lab Results   Component Value Date    GLUCOSE 117 (H) 09/22/2023    BUN 16 09/22/2023    CREATININE 0.93 09/22/2023     09/22/2023    K 4.3 09/22/2023     09/22/2023    CO2 26 09/22/2023    CALCIUM 9.3 09/22/2023    ALBUMIN 4.1 09/22/2023    BILITOT 0.3 09/22/2023    ALKPHOS 111 09/22/2023    AST 21 09/22/2023    ALT 18 09/22/2023             ASSESSMENT & PLAN:    Zachery Milian MD    10/10/2023    CHIEF COMPLAINT: No new somatic complaints    Problem List:  Oncology/Hematology History Overview Note   1.  Reactive leukocytosis resolved  2.  Sleep apnea  3.  History of left tibial fracture  4.  Hypertension  5.  Asthma and allergies  6.  Hypertension  7.  Oncoming microcytosis without anemia    Hematology history timeline:  -1/24/2022 white count 18,000 hemoglobin 12.8 platelets 235,000 no nucleated red cells.  -11/30/2022 white count 13,500 hemoglobin 13 platelets 325,000 unremarkable differential.  -2/13/2023 white count 11,300 hemoglobin " 13.2 platelets 272,000 with unremarkable differential    -3/21/2023 [facility] consult: Patient had traumatic tibial fracture after training rail wooden ties fell on him.  Had pinning of his left tibia.  Chronic pain.  Slowly improving white count over the last year since surgery for this.  Rest of his counts are unremarkable and his differential is unremarkable and alkaline phosphatase back in the fall was minimally elevated.  Given that his white count is approaching normal as of last month we will get his count now and in 3 months and see him back in 6 months along with a sedimentation rate and C-reactive protein and as long as the trend continues I would just assume this to be reactive at the age of 52 and not put him through further work-up.  Odds of a low-grade malignant hematologic process slowly improving spontaneously very low.  Odds of a high-grade malignant hematologic process slowly improving without intervention extremely unlikely.    -3/21/2023 white count 12,590.  Sedimentation rate 82.  C-reactive protein 3.2 both marked elevation  -8/25/2023 white count 11,400  -9/22/2023 white count normal 10,000 with hemoglobin 12.9 MCV low end of normal 79.  Glucose 117 otherwise unremarkable CMP.    -9/26/2023 [facility] follow-up: As expected, with patient's history, his inflammatory reactive leukocytosis has resolved and needs no further work-up and clearly had inflammatory indices as outlined above.  What I do see however is an MCV and hemoglobin starting to drop.  Colonoscopy last year but if his current iron indices are low then he will need repeat colonoscopy as well as an EGD.  Previous scopes done in [place].    -9/26/2023 ferritin normal 331 .  Iron normal 66 with saturation normal 17%.  Normal total iron-binding capacity 393.    -10/10/2023 [facility] follow-up: As previously stated, inflammatory leukocytosis resolved.  No iron deficiency at this junction with normal iron and  "ferritin midrange normal though the ferritin as an acute phase reactant can be falsely increased into the normal range but the total iron-binding capacity is also normal.  I would simply asked his primary care to repeat CBC and iron indices in 6 months and if the MCV and hemoglobin are falling and quarterly iron indices are dropping into the iron deficiency range and he needs repeat colonoscopy and EGD and oral iron supplementation.  I also told him it would be easy to have [facility] Dum[facility] do a Cologuard just for thoroughness in the absence of repeating endoscopy given relatively recent colonoscopy.    I will see on an as-needed basis.     Leucocytosis       HISTORY OF PRESENT ILLNESS:  The patient is a 53 y.o. male, here for follow up on management of resolved leukocytosis with mild decrease in MCV and hemoglobin with normal iron indices    Past Medical History:   Diagnosis Date    Hypertension      No past surgical history on file.    No Known Allergies    Family History and Social History reviewed and changed as necessary    REVIEW OF SYSTEM:   No new somatic complaints.  No change in the color caliber or consistency of stools.    PHYSICAL EXAM:  No jaundice icterus or pallor.    Vitals:    10/10/23 1501   BP: 140/84   Pulse: 63   Resp: 20   Temp: 97.7 øF (36.5 øC)   SpO2: 96%   Weight: (!) 179 kg (394 lb)   Height: 190.5 cm (75\")     Vitals:    10/10/23 1501   PainSc: 0-No pain          ECOG score: 0           Vitals reviewed.    ECOG: (0) Fully Active - Able to Carry On All Pre-disease Performance Without Restriction    Lab Results   Component Value Date    HGB 12.9 (L) 09/22/2023    HCT 40.2 09/22/2023    MCV 79 09/22/2023     09/22/2023    WBC 10.0 09/22/2023    NEUTROABS 7.1 (H) 09/22/2023    LYMPHSABS 2.1 09/22/2023    MONOSABS 0.6 09/22/2023    EOSABS 0.3 09/22/2023    BASOSABS 0.0 09/22/2023       Lab Results   Component Value Date    GLUCOSE 117 (H) 09/22/2023    BUN 16 09/22/2023    " CREATININE 0.93 09/22/2023     09/22/2023    K 4.3 09/22/2023     09/22/2023    CO2 26 09/22/2023    CALCIUM 9.3 09/22/2023    ALBUMIN 4.1 09/22/2023    BILITOT 0.3 09/22/2023    ALKPHOS 111 09/22/2023    AST 21 09/22/2023    ALT 18 09/22/2023             ASSESSMENT & PLAN:  1.  Reactive leukocytosis resolved  2.  Sleep apnea  3.  History of left tibial fracture  4.  Hypertension  5.  Asthma and allergies  6.  Hypertension  7.  Oncoming microcytosis without anemia    Hematology history timeline:  -1/24/2022 white count 18,000 hemoglobin 12.8 platelets 235,000 no nucleated red cells.  -11/30/2022 white count 13,500 hemoglobin 13 platelets 325,000 unremarkable differential.  -2/13/2023 white count 11,300 hemoglobin 13.2 platelets 272,000 with unremarkable differential    -3/21/2023 [facility] consult: Patient had traumatic tibial fracture after training rail wooden ties fell on him.  Had pinning of his left tibia.  Chronic pain.  Slowly improving white count over the last year since surgery for this.  Rest of his counts are unremarkable and his differential is unremarkable and alkaline phosphatase back in the fall was minimally elevated.  Given that his white count is approaching normal as of last month we will get his count now and in 3 months and see him back in 6 months along with a sedimentation rate and C-reactive protein and as long as the trend continues I would just assume this to be reactive at the age of 52 and not put him through further work-up.  Odds of a low-grade malignant hematologic process slowly improving spontaneously very low.  Odds of a high-grade malignant hematologic process slowly improving without intervention extremely unlikely.    -3/21/2023 white count 12,590.  Sedimentation rate 82.  C-reactive protein 3.2 both marked elevation  -8/25/2023 white count 11,400  -9/22/2023 white count normal 10,000 with hemoglobin 12.9 MCV low end of normal 79.  Glucose 117 otherwise  unremarkable CMP.    -9/26/2023 [facility] follow-up: As expected, with patient's history, his inflammatory reactive leukocytosis has resolved and needs no further work-up and clearly had inflammatory indices as outlined above.  What I do see however is an MCV and hemoglobin starting to drop.  Colonoscopy last year but if his current iron indices are low then he will need repeat colonoscopy as well as an EGD.  Previous scopes done in [place].    -9/26/2023 ferritin normal 331 .  Iron normal 66 with saturation normal 17%.  Normal total iron-binding capacity 393.    -10/10/2023 [facility] follow-up: As previously stated, inflammatory leukocytosis resolved.  No iron deficiency at this junction with normal iron and ferritin midrange normal though the ferritin as an acute phase reactant can be falsely increased into the normal range but the total iron-binding capacity is also normal.  I would simply asked his primary care to repeat CBC and iron indices in 6 months and if the MCV and hemoglobin are falling and quarterly iron indices are dropping into the iron deficiency range and he needs repeat colonoscopy and EGD and oral iron supplementation.  I also told him it would be easy to have [facility] Dumont do a Cologuard just for thoroughness in the absence of repeating endoscopy given relatively recent colonoscopy.  He sees Dr. David December 5.  I will see on an as-needed basis.    Total time of care today inclusive of time spent today prior to patient's arrival reviewing interval data and during visit interviewing him as to signs or symptoms of potential inflammatory and iron deficiency-inducing conditions of which he has none ongoing and after visit instituting the plan as outlined above took 15 minutes of patient care time throughout the day today.  Zachery Milian MD    10/10/2023

## 2023-11-09 ENCOUNTER — TELEPHONE (OUTPATIENT)
Dept: FAMILY MEDICINE CLINIC | Facility: CLINIC | Age: 53
End: 2023-11-09
Payer: COMMERCIAL

## 2023-11-09 NOTE — TELEPHONE ENCOUNTER
Caller: Zane Rebollar    Relationship to patient: Self    Best call back number: [phone]     Patient is needing: PATIENT STATED HE HAS NOT BEEN ABLE TO GET A REFILL ON HIS WEGOVY FOR MONTH AND HALF DUE TO NONE IN STOCK.    PATIENT WOULD LIKE TO KNOW IF THERE IS AN ALTERNATIVE.

## 2023-11-14 NOTE — TELEPHONE ENCOUNTER
The weight loss injectables are hard to get currently. We could try saxenda but it is a once daily injection and no guarantee that insurance will cover it. Supposedly they are saying the wegovy is to be back in stock the beginning of the year. Let me know what he wants to do.

## 2023-11-14 NOTE — TELEPHONE ENCOUNTER
PATIENT STATED HE HAS NOT BEEN ABLE TO GET A REFILL ON HIS WEGOVY FOR MONTH AND HALF DUE TO NONE IN STOCK.     PATIENT WOULD LIKE TO KNOW IF THERE IS AN ALTERNATIVE.

## 2023-11-21 RX ORDER — SODIUM, POTASSIUM,MAG SULFATES 17.5-3.13G
2 SOLUTION, RECONSTITUTED, ORAL ORAL TAKE AS DIRECTED
Qty: 354 ML | Refills: 0 | Status: SHIPPED | OUTPATIENT
Start: 2023-11-21

## 2024-01-16 ENCOUNTER — TELEPHONE (OUTPATIENT)
Dept: FAMILY MEDICINE CLINIC | Facility: CLINIC | Age: 54
End: 2024-01-16
Payer: COMMERCIAL

## 2024-01-16 DIAGNOSIS — G47.33 OSA (OBSTRUCTIVE SLEEP APNEA): Primary | ICD-10-CM

## 2024-01-16 NOTE — TELEPHONE ENCOUNTER
Caller: Zane Rebollar    Relationship: Self    Best call back number:[phone]     What medication are you requesting:   CPAP MASK   CPAP TUBING     If a prescription is needed, what is your preferred pharmacy and phone number:    [place], [place] - [address] [facility] - [phone]  - [phone]     Additional notes:  PATIENT IS NEEDING A ORDER TO BE SENT TO THE PHARMACY IN ORDER TO BE ABLE TO GET THE NEW MASK AND TUBING THAT IS NEEDED FOR HIS CPAP MACHINE

## 2024-02-13 ENCOUNTER — TELEPHONE (OUTPATIENT)
Dept: FAMILY MEDICINE CLINIC | Facility: CLINIC | Age: 54
End: 2024-02-13
Payer: COMMERCIAL

## 2024-02-13 DIAGNOSIS — E66.09 OBESITY DUE TO EXCESS CALORIES WITH SERIOUS COMORBIDITY, UNSPECIFIED CLASSIFICATION: Primary | ICD-10-CM

## 2024-04-02 ENCOUNTER — TELEPHONE (OUTPATIENT)
Dept: FAMILY MEDICINE CLINIC | Facility: CLINIC | Age: 54
End: 2024-04-02
Payer: COMMERCIAL

## 2024-04-02 NOTE — TELEPHONE ENCOUNTER
It looks like we discussed this in a past telephone message and I put in a referral to the weight management team on 2/15/24. Please check on status of that appointment for pt. And let him know. Thanks

## 2024-04-02 NOTE — TELEPHONE ENCOUNTER
Caller: BRISSA VAZ    Relationship: Emergency Contact    Best call back number: [phone]     Which medication are you concerned about:     Semaglutide-Weight Management (Wegovy) 0.25 MG/0.5ML solution auto-injector  0.25 mg, Weekly       Who prescribed you this medication: ZACK HARRIS    When did you start taking this medication: HASN'T YET BECAUSE CANNOT FIND AT ANY  PHARMACY.    What are your concerns: HAVING KNEE SURGERY AND NEEDS TO LOSE WEIGHT TO RECOVER BETTER.    How long have you had these concerns: SINCE PRESCRIBED THIS MEDICATION. CAN YOU PLEASE SEND IN A DIFFERENT MEDICATION FOR THIS PATIENT SO HE CAN START IT ASAP?  THANK YOU.

## 2024-04-03 NOTE — TELEPHONE ENCOUNTER
THEY SHOULD SEND HIM A PACKET TO FILL OUT AND SEND BACK.    I CALL HIM AND PATIENT HAS BEEN OUT OF TOWN FOR A COUPLE WEEKS, WILL CHECK FOR PACKET WHEN HE GETS BACK.

## 2024-04-08 DIAGNOSIS — I10 BENIGN ESSENTIAL HTN: ICD-10-CM

## 2024-04-08 NOTE — TELEPHONE ENCOUNTER
Caller: Zane Rebollar    Relationship: Self    Best call back number: [phone]     Requested Prescriptions:   Requested Prescriptions     Pending Prescriptions Disp Refills    valsartan (DIOVAN) 80 MG tablet [Pharmacy Med Name: VALSARTAN 80 MG TABLET] 90 tablet 1     Sig: TAKE 1 TABLET BY MOUTH DAILY    amLODIPine (NORVASC) 10 MG tablet 90 tablet 1     Sig: Take 1 tablet by mouth Daily.        Pharmacy where request should be sent: [facility] [id number] [place], [place] - [address] [facility] AT [facility] [address] - [phone]  - [phone] FX     Last office visit with prescribing clinician: 9/22/2023   Last telemedicine visit with prescribing clinician: Visit date not found   Next office visit with prescribing clinician: Visit date not found     Additional details provided by patient: GOING OUT OF TOWN ON WEDNESDAY    Does the patient have less than a 3 day supply:  [] Yes  [x] No    Would you like a call back once the refill request has been completed: [] Yes [x] No    If the office needs to give you a call back, can they leave a voicemail: [] Yes [x] No    Pratima Duron Rep   04/08/24 08:42 EDT

## 2024-04-09 RX ORDER — VALSARTAN 80 MG/1
80 TABLET ORAL DAILY
Qty: 60 TABLET | Refills: 0 | Status: SHIPPED | OUTPATIENT
Start: 2024-04-09

## 2024-04-09 RX ORDER — AMLODIPINE BESYLATE 10 MG/1
10 TABLET ORAL DAILY
Qty: 30 TABLET | Refills: 0 | Status: SHIPPED | OUTPATIENT
Start: 2024-04-09

## 2024-04-12 ENCOUNTER — TELEPHONE (OUTPATIENT)
Dept: FAMILY MEDICINE CLINIC | Facility: CLINIC | Age: 54
End: 2024-04-12
Payer: COMMERCIAL

## 2024-04-12 DIAGNOSIS — E66.9 OBESITY, UNSPECIFIED CLASSIFICATION, UNSPECIFIED OBESITY TYPE, UNSPECIFIED WHETHER SERIOUS COMORBIDITY PRESENT: Primary | ICD-10-CM

## 2024-04-12 NOTE — TELEPHONE ENCOUNTER
See below, will a PA help? If not, let pt. Know I would then recommend referring him to the weight management team. Thanks

## 2024-04-12 NOTE — TELEPHONE ENCOUNTER
----- Message from Aileen Trinidad MA sent at 4/12/2024  8:44 AM EDT -----  Regarding: NGA Hilton  Contact: [phone]    ----- Message -----  From: Zane Rebollar  Sent: 4/12/2024   7:21 AM EDT  To: [facility]  Subject: Lida Dumont,    The prescription you sent is very expensive. Is there another option?  Roberto Carlos Rebollar

## 2024-04-16 NOTE — TELEPHONE ENCOUNTER
HUB TO RELAY    Looks like wegovy was sent to pharm in Sept? Did it need a prior authorization?  Let pt. Know I would then recommend referring him to the weight management team. Thanks

## 2024-05-13 ENCOUNTER — TELEPHONE (OUTPATIENT)
Dept: FAMILY MEDICINE CLINIC | Facility: CLINIC | Age: 54
End: 2024-05-13
Payer: COMMERCIAL

## 2024-05-13 DIAGNOSIS — I10 BENIGN ESSENTIAL HTN: ICD-10-CM

## 2024-05-13 RX ORDER — AMLODIPINE BESYLATE 10 MG/1
10 TABLET ORAL DAILY
Qty: 30 TABLET | Refills: 0 | Status: SHIPPED | OUTPATIENT
Start: 2024-05-13

## 2024-05-13 NOTE — TELEPHONE ENCOUNTER
Caller: Keturah Zane    Relationship: Self    Best call back number: [phone]     Requested Prescriptions:   Requested Prescriptions     Pending Prescriptions Disp Refills    amLODIPine (NORVASC) 10 MG tablet 30 tablet 0     Sig: Take 1 tablet by mouth Daily.      WITH REFILLS  Pharmacy where request should be sent: [facility] [id number] [address] [facility] AT [facility] [address] - [phone]  - [phone] FX     Last office visit with prescribing clinician: 9/22/2023   Last telemedicine visit with prescribing clinician: Visit date not found   Next office visit with prescribing clinician: Visit date not found     Additional details provided by patient: OUT OF MEDICATION     Does the patient have less than a 3 day supply:  [x] Yes  [] No    Would you like a call back once the refill request has been completed: [] Yes [x] No    If the office needs to give you a call back, can they leave a voicemail: [] Yes [x] No    Pratima Duron Rep   05/13/24 09:53 EDT

## 2024-05-15 NOTE — TELEPHONE ENCOUNTER
Name: Zane Rebollar      Relationship: Self      Best Callback Number: [phone]       HUB PROVIDED THE RELAY MESSAGE FROM THE OFFICE      PATIENT: VOICED UNDERSTANDING AND HAS NO FURTHER QUESTIONS AT THIS TIME    ADDITIONAL INFORMATION: PATIENT HAS BEEN SCHEDULED WITH ZACK REGALADO.

## 2024-05-31 ENCOUNTER — OFFICE VISIT (OUTPATIENT)
Dept: FAMILY MEDICINE CLINIC | Facility: CLINIC | Age: 54
End: 2024-05-31
Payer: COMMERCIAL

## 2024-05-31 VITALS
OXYGEN SATURATION: 96 % | HEART RATE: 97 BPM | DIASTOLIC BLOOD PRESSURE: 86 MMHG | HEIGHT: 75 IN | SYSTOLIC BLOOD PRESSURE: 138 MMHG | WEIGHT: 315 LBS | BODY MASS INDEX: 39.17 KG/M2

## 2024-05-31 DIAGNOSIS — R73.03 PREDIABETES: ICD-10-CM

## 2024-05-31 DIAGNOSIS — E78.2 MIXED HYPERLIPIDEMIA: Primary | ICD-10-CM

## 2024-05-31 DIAGNOSIS — I10 BENIGN ESSENTIAL HTN: ICD-10-CM

## 2024-05-31 DIAGNOSIS — J45.20 MILD INTERMITTENT ASTHMA WITHOUT COMPLICATION: ICD-10-CM

## 2024-05-31 DIAGNOSIS — Z79.899 ENCOUNTER FOR LONG-TERM (CURRENT) USE OF OTHER MEDICATIONS: ICD-10-CM

## 2024-05-31 PROCEDURE — 99214 OFFICE O/P EST MOD 30 MIN: CPT | Performed by: NURSE PRACTITIONER

## 2024-05-31 RX ORDER — VALSARTAN 80 MG/1
80 TABLET ORAL DAILY
Qty: 60 TABLET | OUTPATIENT
Start: 2024-05-31

## 2024-05-31 RX ORDER — AMLODIPINE BESYLATE 10 MG/1
10 TABLET ORAL DAILY
Qty: 90 TABLET | Refills: 1 | Status: SHIPPED | OUTPATIENT
Start: 2024-05-31

## 2024-05-31 RX ORDER — VALSARTAN 80 MG/1
80 TABLET ORAL DAILY
Qty: 90 TABLET | Refills: 1 | Status: SHIPPED | OUTPATIENT
Start: 2024-05-31

## 2024-05-31 NOTE — PROGRESS NOTES
"Chief Complaint  Hypertension and Hyperlipidemia    Subjective          Zane Rebollar presents to [facility]  Hypertension  Pertinent negatives include no shortness of breath.   Hyperlipidemia  Pertinent negatives include no shortness of breath.     Presents for med refills.  Hypertension well-controlled on amlodipine and valsartan.  Tries to watch his diet and he does stay active.  No dizziness no headache no chest pain no chest pressure no shortness of breath no trouble breathing no urinary or bowel issues.  He also has history of obstructive sleep apnea and he does continue to wear his CPAP nightly.  No issues or concerns today and states doing well    Objective   Vital Signs:   /86   Pulse 97   Ht 190.5 cm (75\")   Wt (!) 180 kg (396 lb 9 oz)   SpO2 96%   BMI 49.57 kg/m²     Body mass index is 49.57 kg/m².    Review of Systems   Constitutional:  Negative for chills, fatigue and fever.   HENT:  Negative for congestion.    Eyes:  Negative for visual disturbance.   Respiratory:  Negative for cough, shortness of breath and wheezing.    Cardiovascular: Negative.    Gastrointestinal:  Negative for abdominal pain, constipation, diarrhea, nausea and vomiting.   Genitourinary:  Negative for decreased urine volume, dysuria, frequency, hematuria and urgency.   Musculoskeletal:  Negative for back pain.   Neurological:  Negative for headache.       Past History:  Medical History: has a past medical history of Hypertension.   Surgical History: has no past surgical history on file.   Family History: family history includes Lymphoma in his mother; Testicular cancer in his maternal uncle.   Social History: reports that he has never smoked. He has never used smokeless tobacco. He reports current alcohol use.    PHQ-2 Depression Screening  Little interest or pleasure in doing things? 0-->not at all   Feeling down, depressed, or hopeless? 0-->not at all   PHQ-2 Total Score 0        PHQ-9 " Depression Screening  Little interest or pleasure in doing things? 0-->not at all   Feeling down, depressed, or hopeless? 0-->not at all   Trouble falling or staying asleep, or sleeping too much?     Feeling tired or having little energy?     Poor appetite or overeating?     Feeling bad about yourself - or that you are a failure or have let yourself or your family down?     Trouble concentrating on things, such as reading the newspaper or watching television?     Moving or speaking so slowly that other people could have noticed? Or the opposite - being so fidgety or restless that you have been moving around a lot more than usual?     Thoughts that you would be better off dead, or of hurting yourself in some way?     PHQ-9 Total Score 0   If you checked off any problems, how difficult have these problems made it for you to do your work, take care of things at home, or get along with other people?       PHQ-9 Total Score: 0      Patient screened positive for depression based on a PHQ-9 score of 0 on 5/31/2024. Follow-up recommendations include:          Current Outpatient Medications:     albuterol sulfate  (90 Base) MCG/ACT inhaler, Inhale 2 puffs Every 4 (Four) Hours As Needed for Wheezing., Disp: 18 g, Rfl: 1    amLODIPine (NORVASC) 10 MG tablet, Take 1 tablet by mouth Daily., Disp: 90 tablet, Rfl: 1    cetirizine (zyrTEC) 10 MG tablet, Take 1 tablet by mouth Daily., Disp: , Rfl:     multivitamin (THERAGRAN) tablet tablet, Take  by mouth Daily., Disp: , Rfl:     valsartan (DIOVAN) 80 MG tablet, Take 1 tablet by mouth Daily., Disp: 90 tablet, Rfl: 1   (Not in a hospital admission)     Allergies: Patient has no known allergies.    Physical Exam  Constitutional:       Appearance: Normal appearance.   Eyes:      Conjunctiva/sclera: Conjunctivae normal.      Pupils: Pupils are equal, round, and reactive to light.   Cardiovascular:      Rate and Rhythm: Normal rate and regular rhythm.      Heart sounds: Normal  heart sounds.   Pulmonary:      Effort: Pulmonary effort is normal.      Breath sounds: Normal breath sounds.   Neurological:      General: No focal deficit present.      Mental Status: He is alert and oriented to person, place, and time. Mental status is at baseline.   Psychiatric:         Mood and Affect: Mood normal.         Behavior: Behavior normal.         Thought Content: Thought content normal.         Judgment: Judgment normal.          Result Review :                   Assessment and Plan    Diagnoses and all orders for this visit:    1. Mixed hyperlipidemia (Primary)  -     Lipid Panel    2. Benign essential HTN  -     amLODIPine (NORVASC) 10 MG tablet; Take 1 tablet by mouth Daily.  Dispense: 90 tablet; Refill: 1  -     valsartan (DIOVAN) 80 MG tablet; Take 1 tablet by mouth Daily.  Dispense: 90 tablet; Refill: 1    3. Prediabetes  -     Hemoglobin A1c    4. Encounter for long-term (current) use of other medications  -     CBC & Differential  -     Comprehensive Metabolic Panel    5. Mild intermittent asthma without complication      Drawn.  Meds refilled.  Goal blood pressure less than 140/90.  Let me know if gets 2 or above that.  Proper diet and exercise plan discussed and encouraged.  Education provided.  Risk of meds discussed and understood.  Return to clinic or ED with any issues or concerns.                   Follow Up   Return in about 6 months (around 11/30/2024) for Annual physical.  Patient was given instructions and counseling regarding his condition or for health maintenance advice. Please see specific information pulled into the AVS if appropriate.     ZACK Felix

## 2024-06-01 LAB
ALBUMIN SERPL-MCNC: 3.9 G/DL (ref 3.8–4.9)
ALBUMIN/GLOB SERPL: 1.1 {RATIO} (ref 1.2–2.2)
ALP SERPL-CCNC: 101 IU/L (ref 44–121)
ALT SERPL-CCNC: 23 IU/L (ref 0–44)
AST SERPL-CCNC: 32 IU/L (ref 0–40)
BASOPHILS # BLD AUTO: 0 X10E3/UL (ref 0–0.2)
BASOPHILS NFR BLD AUTO: 0 %
BILIRUB SERPL-MCNC: 0.3 MG/DL (ref 0–1.2)
BUN SERPL-MCNC: 16 MG/DL (ref 6–24)
BUN/CREAT SERPL: 16 (ref 9–20)
CALCIUM SERPL-MCNC: 9.3 MG/DL (ref 8.7–10.2)
CHLORIDE SERPL-SCNC: 103 MMOL/L (ref 96–106)
CHOLEST SERPL-MCNC: 211 MG/DL (ref 100–199)
CO2 SERPL-SCNC: 22 MMOL/L (ref 20–29)
CREAT SERPL-MCNC: 0.99 MG/DL (ref 0.76–1.27)
EGFRCR SERPLBLD CKD-EPI 2021: 91 ML/MIN/1.73
EOSINOPHIL # BLD AUTO: 0.2 X10E3/UL (ref 0–0.4)
EOSINOPHIL NFR BLD AUTO: 2 %
ERYTHROCYTE [DISTWIDTH] IN BLOOD BY AUTOMATED COUNT: 14.6 % (ref 11.6–15.4)
GLOBULIN SER CALC-MCNC: 3.6 G/DL (ref 1.5–4.5)
GLUCOSE SERPL-MCNC: 98 MG/DL (ref 70–99)
HBA1C MFR BLD: 6 % (ref 4.8–5.6)
HCT VFR BLD AUTO: 40.2 % (ref 37.5–51)
HDLC SERPL-MCNC: 46 MG/DL
HGB BLD-MCNC: 12.8 G/DL (ref 13–17.7)
IMM GRANULOCYTES # BLD AUTO: 0.1 X10E3/UL (ref 0–0.1)
IMM GRANULOCYTES NFR BLD AUTO: 1 %
LDLC SERPL CALC-MCNC: 139 MG/DL (ref 0–99)
LYMPHOCYTES # BLD AUTO: 2.1 X10E3/UL (ref 0.7–3.1)
LYMPHOCYTES NFR BLD AUTO: 22 %
MCH RBC QN AUTO: 25.1 PG (ref 26.6–33)
MCHC RBC AUTO-ENTMCNC: 31.8 G/DL (ref 31.5–35.7)
MCV RBC AUTO: 79 FL (ref 79–97)
MONOCYTES # BLD AUTO: 0.6 X10E3/UL (ref 0.1–0.9)
MONOCYTES NFR BLD AUTO: 6 %
NEUTROPHILS # BLD AUTO: 6.3 X10E3/UL (ref 1.4–7)
NEUTROPHILS NFR BLD AUTO: 69 %
PLATELET # BLD AUTO: 244 X10E3/UL (ref 150–450)
POTASSIUM SERPL-SCNC: 5 MMOL/L (ref 3.5–5.2)
PROT SERPL-MCNC: 7.5 G/DL (ref 6–8.5)
RBC # BLD AUTO: 5.1 X10E6/UL (ref 4.14–5.8)
SODIUM SERPL-SCNC: 140 MMOL/L (ref 134–144)
TRIGL SERPL-MCNC: 145 MG/DL (ref 0–149)
VLDLC SERPL CALC-MCNC: 26 MG/DL (ref 5–40)
WBC # BLD AUTO: 9.3 X10E3/UL (ref 3.4–10.8)

## 2024-06-17 ENCOUNTER — TELEPHONE (OUTPATIENT)
Dept: FAMILY MEDICINE CLINIC | Facility: CLINIC | Age: 54
End: 2024-06-17
Payer: COMMERCIAL

## 2024-06-17 DIAGNOSIS — I10 BENIGN ESSENTIAL HTN: ICD-10-CM

## 2024-06-17 RX ORDER — AMLODIPINE BESYLATE 10 MG/1
10 TABLET ORAL DAILY
Qty: 30 TABLET | Refills: 0 | Status: SHIPPED | OUTPATIENT
Start: 2024-06-17

## 2024-11-30 DIAGNOSIS — I10 BENIGN ESSENTIAL HTN: ICD-10-CM

## 2024-12-02 ENCOUNTER — TELEPHONE (OUTPATIENT)
Dept: FAMILY MEDICINE CLINIC | Facility: CLINIC | Age: 54
End: 2024-12-02
Payer: COMMERCIAL

## 2024-12-02 RX ORDER — VALSARTAN 80 MG/1
80 TABLET ORAL DAILY
Qty: 30 TABLET | Refills: 0 | Status: SHIPPED | OUTPATIENT
Start: 2024-12-02

## 2024-12-03 DIAGNOSIS — I10 BENIGN ESSENTIAL HTN: ICD-10-CM

## 2024-12-03 RX ORDER — AMLODIPINE BESYLATE 10 MG/1
10 TABLET ORAL DAILY
Qty: 30 TABLET | Refills: 0 | Status: SHIPPED | OUTPATIENT
Start: 2024-12-03

## 2024-12-03 NOTE — TELEPHONE ENCOUNTER
Caller: Zane Rebollar    Relationship: Self    Best call back number: [phone]     Requested Prescriptions:   Requested Prescriptions     Pending Prescriptions Disp Refills    amLODIPine (NORVASC) 10 MG tablet 30 tablet 0     Sig: Take 1 tablet by mouth Daily.        Pharmacy where request should be sent: [facility] [id number] [place], [place] - [address] [facility] [address] - [phone]  - [phone]     Last office visit with prescribing clinician: 5/31/2024   Last telemedicine visit with prescribing clinician: Visit date not found   Next office visit with prescribing clinician: 12/13/2024     Additional details provided by patient: PATIENT HAS MORE THAN A WEEK REMAINING OF THIS MEDICATION      Does the patient have less than a 3 day supply:  [] Yes  [x] No    Would you like a call back once the refill request has been completed: [] Yes [x] No    If the office needs to give you a call back, can they leave a voicemail: [] Yes [x] No    Pratima Pimentel Rep   12/03/24 10:49 EST